# Patient Record
Sex: FEMALE | Race: WHITE | NOT HISPANIC OR LATINO
[De-identification: names, ages, dates, MRNs, and addresses within clinical notes are randomized per-mention and may not be internally consistent; named-entity substitution may affect disease eponyms.]

---

## 2017-01-20 ENCOUNTER — APPOINTMENT (OUTPATIENT)
Dept: PHYSICAL MEDICINE AND REHAB | Facility: CLINIC | Age: 57
End: 2017-01-20

## 2017-01-20 VITALS
OXYGEN SATURATION: 96 % | HEART RATE: 64 BPM | DIASTOLIC BLOOD PRESSURE: 84 MMHG | SYSTOLIC BLOOD PRESSURE: 106 MMHG | TEMPERATURE: 97.9 F

## 2017-01-24 ENCOUNTER — OTHER (OUTPATIENT)
Age: 57
End: 2017-01-24

## 2017-01-24 LAB
24R-OH-CALCIDIOL SERPL-MCNC: 34.4 PG/ML
ALBUMIN SERPL ELPH-MCNC: 4.5 G/DL
ALP BLD-CCNC: 101 U/L
ALT SERPL-CCNC: 27 U/L
ANION GAP SERPL CALC-SCNC: 18 MMOL/L
AST SERPL-CCNC: 26 U/L
BASOPHILS # BLD AUTO: 0.02 K/UL
BASOPHILS NFR BLD AUTO: 0.4 %
BILIRUB SERPL-MCNC: 0.6 MG/DL
BUN SERPL-MCNC: 13 MG/DL
CALCIUM SERPL-MCNC: 10.2 MG/DL
CHLORIDE SERPL-SCNC: 99 MMOL/L
CHOLEST SERPL-MCNC: 210 MG/DL
CHOLEST/HDLC SERPL: 2.5 RATIO
CO2 SERPL-SCNC: 24 MMOL/L
CREAT SERPL-MCNC: 0.44 MG/DL
EOSINOPHIL # BLD AUTO: 0.18 K/UL
EOSINOPHIL NFR BLD AUTO: 3.7 %
GLUCOSE SERPL-MCNC: 95 MG/DL
HCT VFR BLD CALC: 41.2 %
HDLC SERPL-MCNC: 85 MG/DL
HGB BLD-MCNC: 13.3 G/DL
IMM GRANULOCYTES NFR BLD AUTO: 0.2 %
LDLC SERPL CALC-MCNC: 113 MG/DL
LYMPHOCYTES # BLD AUTO: 1.06 K/UL
LYMPHOCYTES NFR BLD AUTO: 21.8 %
MAN DIFF?: NORMAL
MCHC RBC-ENTMCNC: 28.9 PG
MCHC RBC-ENTMCNC: 32.3 GM/DL
MCV RBC AUTO: 89.6 FL
MONOCYTES # BLD AUTO: 0.4 K/UL
MONOCYTES NFR BLD AUTO: 8.2 %
NEUTROPHILS # BLD AUTO: 3.2 K/UL
NEUTROPHILS NFR BLD AUTO: 65.7 %
PLATELET # BLD AUTO: 282 K/UL
POTASSIUM SERPL-SCNC: 3.8 MMOL/L
PROT SERPL-MCNC: 7.2 G/DL
RBC # BLD: 4.6 M/UL
RBC # FLD: 14.2 %
SODIUM SERPL-SCNC: 142 MMOL/L
TRIGL SERPL-MCNC: 58 MG/DL
TSH SERPL-ACNC: 0.76 UIU/ML
WBC # FLD AUTO: 4.87 K/UL

## 2018-12-05 ENCOUNTER — INPATIENT (INPATIENT)
Facility: HOSPITAL | Age: 58
LOS: 3 days | Discharge: HOME | DRG: 602 | End: 2018-12-09
Attending: INTERNAL MEDICINE | Admitting: INTERNAL MEDICINE
Payer: MEDICARE

## 2018-12-05 VITALS
WEIGHT: 149.91 LBS | SYSTOLIC BLOOD PRESSURE: 89 MMHG | RESPIRATION RATE: 12 BRPM | TEMPERATURE: 103 F | DIASTOLIC BLOOD PRESSURE: 66 MMHG | OXYGEN SATURATION: 96 % | HEART RATE: 88 BPM

## 2018-12-05 DIAGNOSIS — L03.119 CELLULITIS OF UNSPECIFIED PART OF LIMB: ICD-10-CM

## 2018-12-05 DIAGNOSIS — G90.4 AUTONOMIC DYSREFLEXIA: ICD-10-CM

## 2018-12-05 DIAGNOSIS — R60.9 EDEMA, UNSPECIFIED: ICD-10-CM

## 2018-12-05 DIAGNOSIS — G95.20 UNSPECIFIED CORD COMPRESSION: ICD-10-CM

## 2018-12-05 DIAGNOSIS — L89.90 PRESSURE ULCER OF UNSPECIFIED SITE, UNSPECIFIED STAGE: ICD-10-CM

## 2018-12-05 DIAGNOSIS — N39.0 URINARY TRACT INFECTION, SITE NOT SPECIFIED: ICD-10-CM

## 2018-12-05 DIAGNOSIS — M62.838 OTHER MUSCLE SPASM: ICD-10-CM

## 2018-12-05 DIAGNOSIS — Z98.1 ARTHRODESIS STATUS: Chronic | ICD-10-CM

## 2018-12-05 DIAGNOSIS — E87.6 HYPOKALEMIA: ICD-10-CM

## 2018-12-05 DIAGNOSIS — Z29.9 ENCOUNTER FOR PROPHYLACTIC MEASURES, UNSPECIFIED: ICD-10-CM

## 2018-12-05 LAB
ALBUMIN SERPL ELPH-MCNC: 2.9 G/DL — LOW (ref 3.3–5)
ALP SERPL-CCNC: 104 U/L — SIGNIFICANT CHANGE UP (ref 40–120)
ALT FLD-CCNC: 60 U/L — SIGNIFICANT CHANGE UP (ref 12–78)
ANION GAP SERPL CALC-SCNC: 11 MMOL/L — SIGNIFICANT CHANGE UP (ref 5–17)
APPEARANCE UR: ABNORMAL
AST SERPL-CCNC: 75 U/L — HIGH (ref 15–37)
BASOPHILS # BLD AUTO: 0 K/UL — SIGNIFICANT CHANGE UP (ref 0–0.2)
BASOPHILS NFR BLD AUTO: 0 % — SIGNIFICANT CHANGE UP (ref 0–2)
BILIRUB SERPL-MCNC: 1.4 MG/DL — HIGH (ref 0.2–1.2)
BILIRUB UR-MCNC: ABNORMAL
BUN SERPL-MCNC: 18 MG/DL — SIGNIFICANT CHANGE UP (ref 7–23)
CALCIUM SERPL-MCNC: 8.4 MG/DL — LOW (ref 8.5–10.1)
CHLORIDE SERPL-SCNC: 101 MMOL/L — SIGNIFICANT CHANGE UP (ref 96–108)
CO2 SERPL-SCNC: 25 MMOL/L — SIGNIFICANT CHANGE UP (ref 22–31)
COLOR SPEC: YELLOW — SIGNIFICANT CHANGE UP
CREAT SERPL-MCNC: 0.33 MG/DL — LOW (ref 0.5–1.3)
DIFF PNL FLD: NEGATIVE — SIGNIFICANT CHANGE UP
EOSINOPHIL # BLD AUTO: 0 K/UL — SIGNIFICANT CHANGE UP (ref 0–0.5)
EOSINOPHIL NFR BLD AUTO: 0 % — SIGNIFICANT CHANGE UP (ref 0–6)
GLUCOSE SERPL-MCNC: 106 MG/DL — HIGH (ref 70–99)
GLUCOSE UR QL: NEGATIVE — SIGNIFICANT CHANGE UP
HCT VFR BLD CALC: 36.4 % — SIGNIFICANT CHANGE UP (ref 34.5–45)
HGB BLD-MCNC: 12.5 G/DL — SIGNIFICANT CHANGE UP (ref 11.5–15.5)
KETONES UR-MCNC: ABNORMAL
LACTATE SERPL-SCNC: 0.9 MMOL/L — SIGNIFICANT CHANGE UP (ref 0.7–2)
LEUKOCYTE ESTERASE UR-ACNC: ABNORMAL
LYMPHOCYTES # BLD AUTO: 0.17 K/UL — LOW (ref 1–3.3)
LYMPHOCYTES # BLD AUTO: 1 % — LOW (ref 13–44)
MCHC RBC-ENTMCNC: 29.3 PG — SIGNIFICANT CHANGE UP (ref 27–34)
MCHC RBC-ENTMCNC: 34.3 GM/DL — SIGNIFICANT CHANGE UP (ref 32–36)
MCV RBC AUTO: 85.4 FL — SIGNIFICANT CHANGE UP (ref 80–100)
MONOCYTES # BLD AUTO: 0.52 K/UL — SIGNIFICANT CHANGE UP (ref 0–0.9)
MONOCYTES NFR BLD AUTO: 3 % — SIGNIFICANT CHANGE UP (ref 2–14)
NEUTROPHILS # BLD AUTO: 16.67 K/UL — HIGH (ref 1.8–7.4)
NEUTROPHILS NFR BLD AUTO: 91 % — HIGH (ref 43–77)
NITRITE UR-MCNC: POSITIVE
PH UR: 5 — SIGNIFICANT CHANGE UP (ref 5–8)
PLATELET # BLD AUTO: 216 K/UL — SIGNIFICANT CHANGE UP (ref 150–400)
POTASSIUM SERPL-MCNC: 3 MMOL/L — LOW (ref 3.5–5.3)
POTASSIUM SERPL-SCNC: 3 MMOL/L — LOW (ref 3.5–5.3)
PROT SERPL-MCNC: 6.5 G/DL — SIGNIFICANT CHANGE UP (ref 6–8.3)
PROT UR-MCNC: 25 MG/DL
RBC # BLD: 4.26 M/UL — SIGNIFICANT CHANGE UP (ref 3.8–5.2)
RBC # FLD: 13.4 % — SIGNIFICANT CHANGE UP (ref 10.3–14.5)
SODIUM SERPL-SCNC: 137 MMOL/L — SIGNIFICANT CHANGE UP (ref 135–145)
SP GR SPEC: 1.02 — SIGNIFICANT CHANGE UP (ref 1.01–1.02)
UROBILINOGEN FLD QL: 1
WBC # BLD: 17.36 K/UL — HIGH (ref 3.8–10.5)
WBC # FLD AUTO: 17.36 K/UL — HIGH (ref 3.8–10.5)

## 2018-12-05 PROCEDURE — 99285 EMERGENCY DEPT VISIT HI MDM: CPT

## 2018-12-05 PROCEDURE — 93010 ELECTROCARDIOGRAM REPORT: CPT

## 2018-12-05 PROCEDURE — 71045 X-RAY EXAM CHEST 1 VIEW: CPT | Mod: 26

## 2018-12-05 PROCEDURE — 93970 EXTREMITY STUDY: CPT | Mod: 26

## 2018-12-05 RX ORDER — SODIUM CHLORIDE 9 MG/ML
1000 INJECTION INTRAMUSCULAR; INTRAVENOUS; SUBCUTANEOUS ONCE
Qty: 0 | Refills: 0 | Status: COMPLETED | OUTPATIENT
Start: 2018-12-05 | End: 2018-12-05

## 2018-12-05 RX ORDER — FAMOTIDINE 10 MG/ML
20 INJECTION INTRAVENOUS
Qty: 0 | Refills: 0 | Status: DISCONTINUED | OUTPATIENT
Start: 2018-12-05 | End: 2018-12-09

## 2018-12-05 RX ORDER — INFLUENZA VIRUS VACCINE 15; 15; 15; 15 UG/.5ML; UG/.5ML; UG/.5ML; UG/.5ML
0.5 SUSPENSION INTRAMUSCULAR ONCE
Qty: 0 | Refills: 0 | Status: DISCONTINUED | OUTPATIENT
Start: 2018-12-05 | End: 2018-12-09

## 2018-12-05 RX ORDER — ACETAMINOPHEN 500 MG
650 TABLET ORAL EVERY 6 HOURS
Qty: 0 | Refills: 0 | Status: DISCONTINUED | OUTPATIENT
Start: 2018-12-05 | End: 2018-12-06

## 2018-12-05 RX ORDER — NITROFURANTOIN MACROCRYSTAL 50 MG
50 CAPSULE ORAL DAILY
Qty: 0 | Refills: 0 | Status: DISCONTINUED | OUTPATIENT
Start: 2018-12-05 | End: 2018-12-09

## 2018-12-05 RX ORDER — RANITIDINE HYDROCHLORIDE 150 MG/1
1 TABLET, FILM COATED ORAL
Qty: 0 | Refills: 0 | COMMUNITY

## 2018-12-05 RX ORDER — PIPERACILLIN AND TAZOBACTAM 4; .5 G/20ML; G/20ML
3.38 INJECTION, POWDER, LYOPHILIZED, FOR SOLUTION INTRAVENOUS ONCE
Qty: 0 | Refills: 0 | Status: COMPLETED | OUTPATIENT
Start: 2018-12-05 | End: 2018-12-05

## 2018-12-05 RX ORDER — VANCOMYCIN HCL 1 G
1000 VIAL (EA) INTRAVENOUS EVERY 12 HOURS
Qty: 0 | Refills: 0 | Status: DISCONTINUED | OUTPATIENT
Start: 2018-12-05 | End: 2018-12-06

## 2018-12-05 RX ORDER — ACETAMINOPHEN 500 MG
650 TABLET ORAL ONCE
Qty: 0 | Refills: 0 | Status: COMPLETED | OUTPATIENT
Start: 2018-12-05 | End: 2018-12-05

## 2018-12-05 RX ORDER — OXYBUTYNIN CHLORIDE 5 MG
1 TABLET ORAL
Qty: 0 | Refills: 0 | COMMUNITY

## 2018-12-05 RX ORDER — OXYBUTYNIN CHLORIDE 5 MG
2.5 TABLET ORAL
Qty: 0 | Refills: 0 | Status: DISCONTINUED | OUTPATIENT
Start: 2018-12-05 | End: 2018-12-09

## 2018-12-05 RX ORDER — POTASSIUM CHLORIDE 20 MEQ
40 PACKET (EA) ORAL EVERY 4 HOURS
Qty: 0 | Refills: 0 | Status: COMPLETED | OUTPATIENT
Start: 2018-12-05 | End: 2018-12-06

## 2018-12-05 RX ORDER — POTASSIUM CHLORIDE 20 MEQ
40 PACKET (EA) ORAL EVERY 4 HOURS
Qty: 0 | Refills: 0 | Status: DISCONTINUED | OUTPATIENT
Start: 2018-12-05 | End: 2018-12-05

## 2018-12-05 RX ORDER — BACLOFEN 100 %
10 POWDER (GRAM) MISCELLANEOUS
Qty: 0 | Refills: 0 | Status: DISCONTINUED | OUTPATIENT
Start: 2018-12-05 | End: 2018-12-09

## 2018-12-05 RX ORDER — NITROFURANTOIN MACROCRYSTAL 50 MG
50 CAPSULE ORAL
Qty: 0 | Refills: 0 | Status: DISCONTINUED | OUTPATIENT
Start: 2018-12-05 | End: 2018-12-05

## 2018-12-05 RX ORDER — NITROFURANTOIN MACROCRYSTAL 50 MG
0 CAPSULE ORAL
Qty: 0 | Refills: 0 | COMMUNITY

## 2018-12-05 RX ORDER — BACLOFEN 100 %
0 POWDER (GRAM) MISCELLANEOUS
Qty: 0 | Refills: 0 | COMMUNITY

## 2018-12-05 RX ORDER — OXYBUTYNIN CHLORIDE 5 MG
0.25 TABLET ORAL
Qty: 0 | Refills: 0 | COMMUNITY

## 2018-12-05 RX ORDER — ENOXAPARIN SODIUM 100 MG/ML
40 INJECTION SUBCUTANEOUS DAILY
Qty: 0 | Refills: 0 | Status: DISCONTINUED | OUTPATIENT
Start: 2018-12-05 | End: 2018-12-09

## 2018-12-05 RX ORDER — OXYBUTYNIN CHLORIDE 5 MG
1 TABLET ORAL THREE TIMES A DAY
Qty: 0 | Refills: 0 | Status: DISCONTINUED | OUTPATIENT
Start: 2018-12-05 | End: 2018-12-05

## 2018-12-05 RX ORDER — VANCOMYCIN HCL 1 G
1000 VIAL (EA) INTRAVENOUS ONCE
Qty: 0 | Refills: 0 | Status: COMPLETED | OUTPATIENT
Start: 2018-12-05 | End: 2018-12-05

## 2018-12-05 RX ORDER — POTASSIUM CHLORIDE 20 MEQ
40 PACKET (EA) ORAL ONCE
Qty: 0 | Refills: 0 | Status: COMPLETED | OUTPATIENT
Start: 2018-12-05 | End: 2018-12-05

## 2018-12-05 RX ORDER — SODIUM CHLORIDE 9 MG/ML
1000 INJECTION INTRAMUSCULAR; INTRAVENOUS; SUBCUTANEOUS
Qty: 0 | Refills: 0 | Status: DISCONTINUED | OUTPATIENT
Start: 2018-12-05 | End: 2018-12-06

## 2018-12-05 RX ADMIN — SODIUM CHLORIDE 1000 MILLILITER(S): 9 INJECTION INTRAMUSCULAR; INTRAVENOUS; SUBCUTANEOUS at 15:53

## 2018-12-05 RX ADMIN — PIPERACILLIN AND TAZOBACTAM 3.38 GRAM(S): 4; .5 INJECTION, POWDER, LYOPHILIZED, FOR SOLUTION INTRAVENOUS at 14:55

## 2018-12-05 RX ADMIN — Medication 40 MILLIEQUIVALENT(S): at 21:02

## 2018-12-05 RX ADMIN — SODIUM CHLORIDE 1000 MILLILITER(S): 9 INJECTION INTRAMUSCULAR; INTRAVENOUS; SUBCUTANEOUS at 13:38

## 2018-12-05 RX ADMIN — Medication 10 MILLIGRAM(S): at 21:02

## 2018-12-05 RX ADMIN — Medication 250 MILLIGRAM(S): at 14:55

## 2018-12-05 RX ADMIN — PIPERACILLIN AND TAZOBACTAM 200 GRAM(S): 4; .5 INJECTION, POWDER, LYOPHILIZED, FOR SOLUTION INTRAVENOUS at 13:38

## 2018-12-05 RX ADMIN — Medication 1000 MILLIGRAM(S): at 15:55

## 2018-12-05 RX ADMIN — SODIUM CHLORIDE 1000 MILLILITER(S): 9 INJECTION INTRAMUSCULAR; INTRAVENOUS; SUBCUTANEOUS at 18:14

## 2018-12-05 RX ADMIN — SODIUM CHLORIDE 1000 MILLILITER(S): 9 INJECTION INTRAMUSCULAR; INTRAVENOUS; SUBCUTANEOUS at 21:03

## 2018-12-05 RX ADMIN — SODIUM CHLORIDE 1000 MILLILITER(S): 9 INJECTION INTRAMUSCULAR; INTRAVENOUS; SUBCUTANEOUS at 14:51

## 2018-12-05 RX ADMIN — Medication 650 MILLIGRAM(S): at 13:33

## 2018-12-05 RX ADMIN — SODIUM CHLORIDE 1000 MILLILITER(S): 9 INJECTION INTRAMUSCULAR; INTRAVENOUS; SUBCUTANEOUS at 15:54

## 2018-12-05 RX ADMIN — SODIUM CHLORIDE 1000 MILLILITER(S): 9 INJECTION INTRAMUSCULAR; INTRAVENOUS; SUBCUTANEOUS at 14:55

## 2018-12-05 RX ADMIN — Medication 650 MILLIGRAM(S): at 14:55

## 2018-12-05 RX ADMIN — SODIUM CHLORIDE 100 MILLILITER(S): 9 INJECTION INTRAMUSCULAR; INTRAVENOUS; SUBCUTANEOUS at 23:48

## 2018-12-05 NOTE — H&P ADULT - NSHPSOCIALHISTORY_GEN_ALL_CORE
Lives at home with siblings. Has 24 hour aides who perform all ADLs.   Denies EtOH use, tobacco use, recreational drug use.  Uses motorized wheelchair. Lives at home with siblings. Has 24 hour aides who perform all ADLs.   Denies EtOH use, tobacco use, recreational drug use.  Uses motorized wheelchair.  NO Flu/ PNA Vaccine,   Mammogram - Normal 2018  Colonoscopy -Nl 2018  PAP smear- Normal

## 2018-12-05 NOTE — H&P ADULT - NEUROLOGICAL DETAILS
responds to verbal commands/alert and oriented x 3/no spontaneous movement/strength decreased no spontaneous movement/alert and oriented x 3/responds to verbal commands/strength decreased/2/2 Quadriplegic/cranial nerves intact

## 2018-12-05 NOTE — ED PROVIDER NOTE - MEDICAL DECISION MAKING DETAILS
bilateral le cellulitis, right >leg, dry abrasion, hx of spinal cord injury, does not ambulate, will obtain labs, give iv abx, obtain doppler r/o dvt

## 2018-12-05 NOTE — H&P ADULT - MUSCULOSKELETAL
details… detailed exam no strength/no AROM, strength in bilateral LE/decreased ROM no joint swelling/no strength/decreased ROM/no AROM, strength in bilateral LE

## 2018-12-05 NOTE — H&P ADULT - SKIN
detailed exam erythematous, macular rash on right anterior shin, stage 1 pressure ulcer on right knee, erythema from dorsum of right foot to level of knee erythema/erythematous, macular rash on right anterior shin, stage 1 pressure ulcer on right knee, erythema from dorsum of right foot to level of knee/warm and dry

## 2018-12-05 NOTE — H&P ADULT - PROBLEM SELECTOR PLAN 7
- holding patient's HCTZ for dehdyration and hypokalemia  - continue to monitor for increased LE edema, electrolyte abnormalities - holding patient's HCTZ for dehydration and hypokalemia  - continue to monitor for increased LE edema, electrolyte abnormalities

## 2018-12-05 NOTE — H&P ADULT - PROBLEM SELECTOR PLAN 3
- K 3.0 likely 2/2 dehydration from vomiting and reduced po intake  - s/p KCl 40 mEqs, 3 1-L boluses NS in ED  - Will order KCl 40 mEq po x3   - follow up AM CMP  - encourage po intake - K 3.0 likely 2/2 dehydration from vomiting and reduced po intake  - s/p KCl 40 mEqs, 3 1-L boluses NS in ED  - Will order KCl 40 mEq po x2 stat  - follow up AM CMP  - encourage po intake

## 2018-12-05 NOTE — H&P ADULT - EXTREMITIES COMMENTS
RLE erythematous and warm to touch from dorsum of foot to knee. Flat, erythematous rash, approximately 5 cm in length x 3 cm in width on right anterior shin. Stage 1 ulcer on right knee with surrounding dry skin RLE erythematous and warm to touch from dorsum of foot to knee. Flat, erythema , warm - right anterior shin./Rt lower EXT Stage 1 dry ulcer on right knee with surrounding dry skin

## 2018-12-05 NOTE — ED PROVIDER NOTE - OBJECTIVE STATEMENT
58 female presents with le cellulitis, states it began yesterday,  hx of spinal cord injury, does not ambulate,  denies fever, no chills.  states she is on macrobid 50 mg maintenance abx prescribed by her pmd Dr Davion Long.   patient states blood pressure is normally 90/60  PMH:   Spinal cord compression  spinal cord injury c4, c5, c6  Water retention

## 2018-12-05 NOTE — H&P ADULT - PROBLEM SELECTOR PLAN 1
- right LE  - with WBC >17 and fever > 102  - s/p vanc and zosyn in ED  - continue vancomycin IV  - patient is not diabetic, will hold Zosyn for now  - ID consult, Dr. Swanson  - patient has diminished UO, likely 2/2 fever and vomiting, s/p 3 1-L boluses  - will give additional 1L bolus, then maintenance fluids at 100 mL/hour  - follow up blood, urine cultures  - continue tylenol suppositories PRN for fever - right LE  - with WBC >17 and fever > 102  - s/p vanc and zosyn in ED  - continue vancomycin IV  - follow up vanc trough  - patient is not diabetic, will hold Zosyn for now  - ID consult, Dr. Swanson  - patient has diminished UO, likely 2/2 fever and vomiting, s/p 3 1-L boluses  - will give additional 1L bolus, then maintenance fluids at 100 mL/hour  - follow up blood, urine cultures  - follow up chest Xray  - continue tylenol suppositories PRN for fever - right LE  - with leukocytosis- WBC >17 and fever > 102  - s/p vanc and zosyn in ED  - continue vancomycin IV 1 Gm q 12 hrs   - follow up vanc trough  - patient is not diabetic, will hold Zosyn for now  - ID consult, Dr. Swanson  - patient has diminished UO, likely 2/2 fever and vomiting, s/p 3 1-L boluses  - will give additional 1L bolus, then maintenance fluids at 100 mL/hour  - follow up blood c/s x 2 , urine cultures  - follow up chest Xray  - continue Tylenol suppositories PRN for fever

## 2018-12-05 NOTE — H&P ADULT - FAMILY HISTORY
Mother  Still living? Unknown  Family history of diabetes mellitus (DM), Age at diagnosis: Age Unknown     Father  Still living? No  Family history of early CAD, Age at diagnosis: Age Unknown

## 2018-12-05 NOTE — ED ADULT TRIAGE NOTE - CHIEF COMPLAINT QUOTE
pt from home, reports right lower leg redness and swelling x 2 days, denies fever, vomiting x 1 yesterday

## 2018-12-05 NOTE — H&P ADULT - NEGATIVE RESPIRATORY AND THORAX SYMPTOMS
no cough/no hemoptysis/no pleuritic chest pain/no dyspnea no wheezing/no hemoptysis/no pleuritic chest pain/no dyspnea/no cough

## 2018-12-05 NOTE — H&P ADULT - PROBLEM SELECTOR PLAN 8
- patient takes nitrofurantoin crystals 50 mg daily as prophylaxis  - nonformulary, patient will need to bring from home - patient takes nitrofurantoin crystals 50 mg daily as prophylaxis  - non formulary, patient will need to bring from home

## 2018-12-05 NOTE — H&P ADULT - RS GEN PE MLT RESP DETAILS PC
no rales/respirations non-labored/good air movement/no rhonchi/clear to auscultation bilaterally/no wheezes/no subcutaneous emphysema

## 2018-12-05 NOTE — H&P ADULT - PROBLEM SELECTOR PLAN 2
- patient reports repeated episodes of hypertension and bradycardia, with associated headache when she is not able to self cath her bladder as quickly as possible  - patient's sister and aides are trained to perform cath and patient has brought own supplies.   - however, patient's sister needs to leave the state on Friday and will not be available for at least a week  - patient will require call button designed for people with limited dexterity so she can alert nursing staff when she needs to be cathed - patient reports repeated episodes of hypertension and bradycardia, with associated headache when she is not able to self cath her bladder as quickly as possible  - patient's sister and aides are trained to perform cath and patient has brought own supplies.   - however, patient's sister needs to leave the state on Friday and will not be available for at least a week  - patient will require call button designed for people with limited dexterity so she can alert nursing staff when she needs to be cathed  - straight cath 5x/day - patient reports repeated episodes of hypertension and bradycardia, with associated headache when she is not able to self cath her bladder as quickly as possible  - patient's sister and aides are trained to perform cath and patient has brought own supplies.   - however, patient's sister needs to leave the state on Friday and will not be available for at least a week  - patient will require call button designed for people with limited dexterity so she can alert nursing staff when she needs to be cath   - straight cath 5x/day

## 2018-12-05 NOTE — CHART NOTE - NSCHARTNOTEFT_GEN_A_CORE
Patient is a 58y old  Female who presents with a chief complaint of right lower extremity redness and swelling. Nursing called to notify PGY2 of elevated temperature of 102.7F and low BP of 93/38 electronic cuff. Patient is quadriplegic and became aware of redness and swelling today. Patient endorsed vomiting last night, and intermittent chills and fever over the past two days, but denies all constitutional symptoms at time of exam. At time of exam, patient was comfortable in bed, in the left lateral recumbant position, with sister at bedside. Repeat BP after 500cc bolus IV was 93/58 manually. Patient denies pmhx of CHF.    Vital Signs Last 24 Hrs  T(C): 39.3 (05 Dec 2018 13:00), Max: 39.3 (05 Dec 2018 13:00)  T(F): 102.7 (05 Dec 2018 13:00), Max: 102.7 (05 Dec 2018 13:00)  HR: 88 (05 Dec 2018 14:26) (86 - 88)  BP: 93/58 (05 Dec 2018 14:26) (89/66 - 93/58)  BP(mean): --  RR: 14 (05 Dec 2018 14:26) (13 - 14)  SpO2: --                          12.5   17.36 )-----------( 216      ( 05 Dec 2018 13:23 )             36.4       Lactate, Blood: 0.9 mmol/L (12-05 @ 14:35)          MEDICATIONS  (STANDING):  potassium chloride    Tablet ER 40 milliEquivalent(s) Oral once  sodium chloride 0.9% Bolus 1000 milliLiter(s) IV Bolus once    MEDICATIONS  (PRN):      Antibiotics Given [ x]  Zosyn 3.375gm IV x 1 and Vancomycin 1000mg IV x 1    Physical Exam:  General: well nourished female, NAD   HEENT: NCAT, MMM, EOMI  Cardio: +S1S2 No M/R/G, No JVD  Pulm: CTA B/L no W/R/R  Skin: distal right lower extremity erythema, dry, Capillary refill <2 seconds, good skin turgor  Abd: Soft, NT, ND, normal BS x 4 quadrants  Ext: b/l +1 non-pitting edema, 2+ pulses throughout, evidence of distal extremity atrophy 2/2 quadriplegia, contracted distal upper extremities 2/2 quadriplegia       A/P: 58y  Female p/w right lower extremity cellulitis  [x ] Blood cultures drawn, f/u results  [x ] Initial Lactate drawn, found to be 0.9  [x ] Repeat Lactate ordered, for 5:35pm  [x ] Antibiotics given, Zosyn 3.375gm IV x 1 and Vancomycin 1000mg IV x 1  [x ] Fluid resuscitation, >30cc/kg. Total amount of fluid given 3L  [x ] Attending Notification, Discussed with Dr. Valerio

## 2018-12-05 NOTE — H&P ADULT - PROBLEM SELECTOR PLAN 4
- stage 1 on right knee  - frequent repositioning  - will order foam cushion for right knee  - wound care consult
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
## 2018-12-05 NOTE — H&P ADULT - PROBLEM SELECTOR PLAN 9
Lovenox for DVT PPX  Famotidine for dyspepsia  bedbound, needs frequent repositioning    IMPROVE VTE Individual Risk Assessment          RISK                                                          Points  [  ] Previous VTE                                                3  [  ] Thrombophilia                                             2  [  ] Lower limb paralysis                                   2        (unable to hold up >15 seconds)    [  ] Current Cancer                                             2         (within 6 months)  [  ] Immobilization > 24 hrs                              1  [  ] ICU/CCU stay > 24 hours                             1  [  ] Age > 60                                                         1    IMPROVE VTE Score: 3 Lovenox for DVT PPX  Famotidine for dyspepsia  bedbound, needs frequent repositioning  Social work consult, patient's sister, who helps provide care, moving to [place]    IMPROVE VTE Individual Risk Assessment          RISK                                                          Points  [  ] Previous VTE                                                3  [  ] Thrombophilia                                             2  [  ] Lower limb paralysis                                   2        (unable to hold up >15 seconds)    [  ] Current Cancer                                             2         (within 6 months)  [  ] Immobilization > 24 hrs                              1  [  ] ICU/CCU stay > 24 hours                             1  [  ] Age > 60                                                         1    IMPROVE VTE Score: 3
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
## 2018-12-05 NOTE — ED PROVIDER NOTE - ATTENDING CONTRIBUTION TO CARE
Fever and chills in this female with quadriplegia. No N/V/D/Cough. Exam revealed female in NAD with marked warmth and erythema to LLE. Quadriplegia. I agree with plan  and management outlined by PA.

## 2018-12-05 NOTE — ED ADULT NURSE NOTE - NSIMPLEMENTINTERV_GEN_ALL_ED
Implemented All Universal Safety Interventions:  [place] to call system. Call bell, personal items and telephone within reach. Instruct patient to call for assistance. Room bathroom lighting operational. Non-slip footwear when patient is off stretcher. Physically safe environment: no spills, clutter or unnecessary equipment. Stretcher in lowest position, wheels locked, appropriate side rails in place.

## 2018-12-05 NOTE — H&P ADULT - PROBLEM SELECTOR PLAN 5
- due cervical spine injury  - s/p cervical fusion surgery at age 15  - frequent repositioning  - self cath supplies brought from home

## 2018-12-05 NOTE — H&P ADULT - NEGATIVE ENMT SYMPTOMS
no throat pain/no dysphagia/no hearing difficulty/no nasal congestion/no ear pain/no nasal discharge/no vertigo/no sinus symptoms/no tinnitus

## 2018-12-05 NOTE — H&P ADULT - MOTOR
No spontaneous movement in bilateral LE, no sensation in bilateral LE. Significantly diminished movement in bilateral UEs (able to pat hands onto button, but not press button with finger or  raise arms)

## 2018-12-06 ENCOUNTER — TRANSCRIPTION ENCOUNTER (OUTPATIENT)
Age: 58
End: 2018-12-06

## 2018-12-06 DIAGNOSIS — D64.9 ANEMIA, UNSPECIFIED: ICD-10-CM

## 2018-12-06 LAB
ALBUMIN SERPL ELPH-MCNC: 3 G/DL — LOW (ref 3.3–5)
ALP SERPL-CCNC: 110 U/L — SIGNIFICANT CHANGE UP (ref 40–120)
ALT FLD-CCNC: 58 U/L — SIGNIFICANT CHANGE UP (ref 12–78)
ANION GAP SERPL CALC-SCNC: 11 MMOL/L — SIGNIFICANT CHANGE UP (ref 5–17)
AST SERPL-CCNC: 54 U/L — HIGH (ref 15–37)
BASE EXCESS BLDA CALC-SCNC: -3.4 MMOL/L — LOW (ref -2–2)
BASOPHILS # BLD AUTO: 0.01 K/UL — SIGNIFICANT CHANGE UP (ref 0–0.2)
BASOPHILS NFR BLD AUTO: 0.1 % — SIGNIFICANT CHANGE UP (ref 0–2)
BILIRUB SERPL-MCNC: 1.8 MG/DL — HIGH (ref 0.2–1.2)
BLOOD GAS COMMENTS ARTERIAL: SIGNIFICANT CHANGE UP
BLOOD GAS COMMENTS ARTERIAL: SIGNIFICANT CHANGE UP
BUN SERPL-MCNC: 9 MG/DL — SIGNIFICANT CHANGE UP (ref 7–23)
CALCIUM SERPL-MCNC: 7.6 MG/DL — LOW (ref 8.5–10.1)
CHLORIDE SERPL-SCNC: 105 MMOL/L — SIGNIFICANT CHANGE UP (ref 96–108)
CO2 SERPL-SCNC: 21 MMOL/L — LOW (ref 22–31)
CREAT SERPL-MCNC: 0.22 MG/DL — LOW (ref 0.5–1.3)
CULTURE RESULTS: NO GROWTH — SIGNIFICANT CHANGE UP
EOSINOPHIL # BLD AUTO: 0 K/UL — SIGNIFICANT CHANGE UP (ref 0–0.5)
EOSINOPHIL NFR BLD AUTO: 0 % — SIGNIFICANT CHANGE UP (ref 0–6)
GLUCOSE SERPL-MCNC: 75 MG/DL — SIGNIFICANT CHANGE UP (ref 70–99)
HCO3 BLDA-SCNC: 22 MMOL/L — LOW (ref 23–27)
HCT VFR BLD CALC: 31.6 % — LOW (ref 34.5–45)
HGB BLD-MCNC: 10.7 G/DL — LOW (ref 11.5–15.5)
HOROWITZ INDEX BLDA+IHG-RTO: 21 — SIGNIFICANT CHANGE UP
IMM GRANULOCYTES NFR BLD AUTO: 0.6 % — SIGNIFICANT CHANGE UP (ref 0–1.5)
LYMPHOCYTES # BLD AUTO: 0.38 K/UL — LOW (ref 1–3.3)
LYMPHOCYTES # BLD AUTO: 3.9 % — LOW (ref 13–44)
MCHC RBC-ENTMCNC: 29.3 PG — SIGNIFICANT CHANGE UP (ref 27–34)
MCHC RBC-ENTMCNC: 33.9 GM/DL — SIGNIFICANT CHANGE UP (ref 32–36)
MCV RBC AUTO: 86.6 FL — SIGNIFICANT CHANGE UP (ref 80–100)
MONOCYTES # BLD AUTO: 0.71 K/UL — SIGNIFICANT CHANGE UP (ref 0–0.9)
MONOCYTES NFR BLD AUTO: 7.2 % — SIGNIFICANT CHANGE UP (ref 2–14)
NEUTROPHILS # BLD AUTO: 8.68 K/UL — HIGH (ref 1.8–7.4)
NEUTROPHILS NFR BLD AUTO: 88.2 % — HIGH (ref 43–77)
PCO2 BLDA: 36 MMHG — SIGNIFICANT CHANGE UP (ref 32–46)
PH BLDA: 7.39 — SIGNIFICANT CHANGE UP (ref 7.35–7.45)
PLATELET # BLD AUTO: 162 K/UL — SIGNIFICANT CHANGE UP (ref 150–400)
PO2 BLDA: 82 MMHG — SIGNIFICANT CHANGE UP (ref 74–108)
POTASSIUM SERPL-MCNC: 3.1 MMOL/L — LOW (ref 3.5–5.3)
POTASSIUM SERPL-SCNC: 3.1 MMOL/L — LOW (ref 3.5–5.3)
PROT SERPL-MCNC: 6.2 G/DL — SIGNIFICANT CHANGE UP (ref 6–8.3)
RBC # BLD: 3.65 M/UL — LOW (ref 3.8–5.2)
RBC # FLD: 13.5 % — SIGNIFICANT CHANGE UP (ref 10.3–14.5)
SAO2 % BLDA: 96 % — SIGNIFICANT CHANGE UP (ref 92–96)
SODIUM SERPL-SCNC: 137 MMOL/L — SIGNIFICANT CHANGE UP (ref 135–145)
SPECIMEN SOURCE: SIGNIFICANT CHANGE UP
WBC # BLD: 9.84 K/UL — SIGNIFICANT CHANGE UP (ref 3.8–10.5)
WBC # FLD AUTO: 9.84 K/UL — SIGNIFICANT CHANGE UP (ref 3.8–10.5)

## 2018-12-06 PROCEDURE — 93010 ELECTROCARDIOGRAM REPORT: CPT

## 2018-12-06 PROCEDURE — 71045 X-RAY EXAM CHEST 1 VIEW: CPT | Mod: 26

## 2018-12-06 RX ORDER — AMPICILLIN SODIUM AND SULBACTAM SODIUM 250; 125 MG/ML; MG/ML
3 INJECTION, POWDER, FOR SUSPENSION INTRAMUSCULAR; INTRAVENOUS EVERY 6 HOURS
Qty: 0 | Refills: 0 | Status: DISCONTINUED | OUTPATIENT
Start: 2018-12-06 | End: 2018-12-09

## 2018-12-06 RX ORDER — VANCOMYCIN HCL 1 G
1000 VIAL (EA) INTRAVENOUS
Qty: 0 | Refills: 0 | Status: DISCONTINUED | OUTPATIENT
Start: 2018-12-06 | End: 2018-12-06

## 2018-12-06 RX ORDER — ACETAMINOPHEN 500 MG
650 TABLET ORAL EVERY 6 HOURS
Qty: 0 | Refills: 0 | Status: DISCONTINUED | OUTPATIENT
Start: 2018-12-06 | End: 2018-12-09

## 2018-12-06 RX ORDER — ACETAMINOPHEN 500 MG
650 TABLET ORAL ONCE
Qty: 0 | Refills: 0 | Status: COMPLETED | OUTPATIENT
Start: 2018-12-06 | End: 2018-12-06

## 2018-12-06 RX ORDER — HYDROCHLOROTHIAZIDE 25 MG
50 TABLET ORAL ONCE
Qty: 0 | Refills: 0 | Status: COMPLETED | OUTPATIENT
Start: 2018-12-06 | End: 2018-12-06

## 2018-12-06 RX ORDER — POTASSIUM CHLORIDE 20 MEQ
10 PACKET (EA) ORAL
Qty: 0 | Refills: 0 | Status: DISCONTINUED | OUTPATIENT
Start: 2018-12-06 | End: 2018-12-06

## 2018-12-06 RX ORDER — IBUPROFEN 200 MG
600 TABLET ORAL ONCE
Qty: 0 | Refills: 0 | Status: COMPLETED | OUTPATIENT
Start: 2018-12-06 | End: 2018-12-06

## 2018-12-06 RX ORDER — SENNA PLUS 8.6 MG/1
2 TABLET ORAL AT BEDTIME
Qty: 0 | Refills: 0 | Status: DISCONTINUED | OUTPATIENT
Start: 2018-12-06 | End: 2018-12-09

## 2018-12-06 RX ORDER — AMPICILLIN SODIUM AND SULBACTAM SODIUM 250; 125 MG/ML; MG/ML
INJECTION, POWDER, FOR SUSPENSION INTRAMUSCULAR; INTRAVENOUS
Qty: 0 | Refills: 0 | Status: DISCONTINUED | OUTPATIENT
Start: 2018-12-06 | End: 2018-12-09

## 2018-12-06 RX ORDER — POTASSIUM CHLORIDE 20 MEQ
40 PACKET (EA) ORAL EVERY 4 HOURS
Qty: 0 | Refills: 0 | Status: DISCONTINUED | OUTPATIENT
Start: 2018-12-06 | End: 2018-12-06

## 2018-12-06 RX ORDER — ACETAMINOPHEN 500 MG
650 TABLET ORAL EVERY 6 HOURS
Qty: 0 | Refills: 0 | Status: DISCONTINUED | OUTPATIENT
Start: 2018-12-06 | End: 2018-12-06

## 2018-12-06 RX ORDER — AMPICILLIN SODIUM AND SULBACTAM SODIUM 250; 125 MG/ML; MG/ML
3 INJECTION, POWDER, FOR SUSPENSION INTRAMUSCULAR; INTRAVENOUS ONCE
Qty: 0 | Refills: 0 | Status: COMPLETED | OUTPATIENT
Start: 2018-12-06 | End: 2018-12-06

## 2018-12-06 RX ORDER — POTASSIUM CHLORIDE 20 MEQ
40 PACKET (EA) ORAL EVERY 4 HOURS
Qty: 0 | Refills: 0 | Status: COMPLETED | OUTPATIENT
Start: 2018-12-06 | End: 2018-12-06

## 2018-12-06 RX ORDER — DOCUSATE SODIUM 100 MG
100 CAPSULE ORAL THREE TIMES A DAY
Qty: 0 | Refills: 0 | Status: DISCONTINUED | OUTPATIENT
Start: 2018-12-06 | End: 2018-12-09

## 2018-12-06 RX ORDER — SODIUM CHLORIDE 9 MG/ML
1000 INJECTION INTRAMUSCULAR; INTRAVENOUS; SUBCUTANEOUS
Qty: 0 | Refills: 0 | Status: DISCONTINUED | OUTPATIENT
Start: 2018-12-06 | End: 2018-12-06

## 2018-12-06 RX ORDER — ALPRAZOLAM 0.25 MG
0.25 TABLET ORAL ONCE
Qty: 0 | Refills: 0 | Status: DISCONTINUED | OUTPATIENT
Start: 2018-12-06 | End: 2018-12-06

## 2018-12-06 RX ADMIN — Medication 650 MILLIGRAM(S): at 10:13

## 2018-12-06 RX ADMIN — Medication 40 MILLIEQUIVALENT(S): at 17:36

## 2018-12-06 RX ADMIN — Medication 10 MILLIGRAM(S): at 12:00

## 2018-12-06 RX ADMIN — Medication 40 MILLIEQUIVALENT(S): at 06:21

## 2018-12-06 RX ADMIN — Medication 600 MILLIGRAM(S): at 12:41

## 2018-12-06 RX ADMIN — Medication 40 MILLIEQUIVALENT(S): at 10:16

## 2018-12-06 RX ADMIN — FAMOTIDINE 20 MILLIGRAM(S): 10 INJECTION INTRAVENOUS at 06:20

## 2018-12-06 RX ADMIN — Medication 600 MILLIGRAM(S): at 19:59

## 2018-12-06 RX ADMIN — Medication 100 MILLIGRAM(S): at 13:33

## 2018-12-06 RX ADMIN — Medication 50 MILLIGRAM(S): at 13:32

## 2018-12-06 RX ADMIN — AMPICILLIN SODIUM AND SULBACTAM SODIUM 200 GRAM(S): 250; 125 INJECTION, POWDER, FOR SUSPENSION INTRAMUSCULAR; INTRAVENOUS at 17:36

## 2018-12-06 RX ADMIN — FAMOTIDINE 20 MILLIGRAM(S): 10 INJECTION INTRAVENOUS at 17:32

## 2018-12-06 RX ADMIN — Medication 650 MILLIGRAM(S): at 10:31

## 2018-12-06 RX ADMIN — Medication 600 MILLIGRAM(S): at 20:40

## 2018-12-06 RX ADMIN — ENOXAPARIN SODIUM 40 MILLIGRAM(S): 100 INJECTION SUBCUTANEOUS at 11:59

## 2018-12-06 RX ADMIN — Medication 650 MILLIGRAM(S): at 16:46

## 2018-12-06 RX ADMIN — Medication 40 MILLIEQUIVALENT(S): at 13:32

## 2018-12-06 RX ADMIN — Medication 600 MILLIGRAM(S): at 13:30

## 2018-12-06 RX ADMIN — Medication 10 MILLIGRAM(S): at 06:21

## 2018-12-06 RX ADMIN — AMPICILLIN SODIUM AND SULBACTAM SODIUM 200 GRAM(S): 250; 125 INJECTION, POWDER, FOR SUSPENSION INTRAMUSCULAR; INTRAVENOUS at 11:54

## 2018-12-06 RX ADMIN — Medication 2.5 MILLIGRAM(S): at 17:32

## 2018-12-06 RX ADMIN — Medication 100 MILLIGRAM(S): at 21:38

## 2018-12-06 RX ADMIN — Medication 250 MILLIGRAM(S): at 02:27

## 2018-12-06 RX ADMIN — Medication 10 MILLIGRAM(S): at 17:32

## 2018-12-06 RX ADMIN — Medication 650 MILLIGRAM(S): at 17:30

## 2018-12-06 RX ADMIN — Medication 2.5 MILLIGRAM(S): at 06:21

## 2018-12-06 NOTE — DISCHARGE NOTE ADULT - MEDICATION SUMMARY - MEDICATIONS TO TAKE
I will START or STAY ON the medications listed below when I get home from the hospital:    hydroCHLOROthiazide 50 mg oral tablet  -- 1 tab(s) by mouth once a day  -- Indication: For Edema    raNITIdine 150 mg oral capsule  -- 1 cap(s) by mouth 2 times a day  -- Indication: For Need for prophylactic measure    Cranberry oral tablet  -- orally once a day  -- Indication: For Need for prophylactic measure    senna oral tablet  -- 2 tab(s) by mouth once a day (at bedtime)  -- Indication: For laxative    docusate sodium 100 mg oral capsule  -- 1 cap(s) by mouth 3 times a day  -- Indication: For laxative    baclofen 10 mg oral tablet  -- orally 4 times a day  -- Indication: For Muscle spasm    Augmentin 875 mg-125 mg oral tablet  -- 1 tab(s) by mouth every 12 hours   -- Finish all this medication unless otherwise directed by prescriber.  Take with food or milk.    -- Indication: For Cellulitis of extremity    Bacid (LAC) oral tablet  -- 1 tab(s) by mouth 2 times a day   -- Indication: For Need for prophylactic measure    nitrofurantoin macrocrystals 50 mg oral capsule  -- orally once a day  -- Indication: For Need for prophylactic measure    oxybutynin 5 mg oral tablet  -- 0.25 tab(s) by mouth 3 times a day  -- Indication: For Need for prophylactic measure I will START or STAY ON the medications listed below when I get home from the hospital:    ibuprofen 400 mg oral tablet  -- 1 tab(s) by mouth every 6 hours, As needed, for pain , HA  -- Indication: For Pain. HA    hydroCHLOROthiazide 50 mg oral tablet  -- 1 tab(s) by mouth once a day as needed for edema  -- Indication: For Edema/ Fluid over load    raNITIdine 150 mg oral capsule  -- 1 cap(s) by mouth 2 times a day  -- Indication: For GI prophylaxis    Cranberry oral tablet  -- orally once a day  -- Indication: For Supplements    senna oral tablet  -- 2 tab(s) by mouth once a day (at bedtime)  -- Indication: For laxative    docusate sodium 100 mg oral capsule  -- 1 cap(s) by mouth 3 times a day  -- Indication: For laxative    baclofen 10 mg oral tablet  -- orally 4 times a day  -- Indication: For Muscle spasm    Augmentin 875 mg-125 mg oral tablet  -- 1 tab(s) by mouth every 12 hours   -- Finish all this medication unless otherwise directed by prescriber.  Take with food or milk.    -- Indication: For Cellulitis of extremity    Bacid (LAC) oral tablet  -- 1 tab(s) by mouth 2 times a day   -- Indication: For Probiotics    nitrofurantoin macrocrystals 50 mg oral capsule  -- orally once a day  -- Indication: For Need for prophylactic for UTI prevention    oxybutynin 5 mg oral tablet  -- 0.25 tab(s) by mouth 3 times a day  -- Indication: For Neurogenic bladder

## 2018-12-06 NOTE — PROGRESS NOTE ADULT - PROBLEM SELECTOR PLAN 5
- due cervical spine injury  - s/p cervical fusion surgery at age 15  - frequent repositioning  - self cath supplies brought from home - due cervical spine injury  - s/p cervical fusion surgery at age 15  - frequent repositioning, using heating pad from home for neck pain  - self cath supplies brought from home - with contractures of hands  - continue baclofen

## 2018-12-06 NOTE — PROGRESS NOTE ADULT - PROBLEM SELECTOR PLAN 3
- K 3.0 likely 2/2 dehydration from vomiting and reduced po intake  - s/p KCl 40 mEqs, 3 1-L boluses NS in ED  - Will order KCl 40 mEq po x2 stat  - follow up AM CMP  - encourage po intake - K 3.1 likely 2/2 dehydration from vomiting and reduced po intake  - s/p KCl 40 mEqs, 3 1-L boluses NS in ED  - KCL Powder 2 doses 40meq Q4H   - KCL 10meq/100ml IVPB Q1H  - encourage po intake - K 3.1 likely 2/2 dehydration from vomiting and reduced po intake  - 40meq PO KCl   - s/p 3 1-L boluses NS in ED  - encourage po intake - K 3.1 likely 2/2 dehydration from vomiting and reduced po intake  - 40meq PO KCl x 2 dose, pt refusing PO liquid/ IV riders, BMP in AM  - encourage po intake

## 2018-12-06 NOTE — CONSULT NOTE ADULT - ASSESSMENT
58f with quadriplegia, htn  admitted fever, leukocytosis secondary to   rle cellulitis --nonpurulent  RRT -- hypoxia 91%  although no cough or s/s of pneumonia

## 2018-12-06 NOTE — DISCHARGE NOTE ADULT - PROVIDER TOKENS
FREE:[LAST:[Mercedes],PHONE:[(   )    -],FAX:[(   )    -]] FREE:[LAST:[Mercedes],PHONE:[(   )    -],FAX:[(   )    -]],TOKEN:'[id number]'

## 2018-12-06 NOTE — PROGRESS NOTE ADULT - PROBLEM SELECTOR PLAN 8
- patient takes nitrofurantoin crystals 50 mg daily as prophylaxis  - non formulary, patient will need to bring from home - due cervical spine injury  - s/p cervical fusion surgery at age 15  - frequent repositioning, using heating pad from home for neck pain  - self cath supplies brought from home

## 2018-12-06 NOTE — DISCHARGE NOTE ADULT - PATIENT PORTAL LINK FT
You can access the [facility] Portal, offered by [facility], by registering with the following website: [email or web address]

## 2018-12-06 NOTE — PROGRESS NOTE ADULT - ASSESSMENT
59 yo F PMH spinal cord injury, functional quadriplegic, muscle spasms, overactive bladder, LE edema, autonomic dysreflexia being admitted for RLE cellulitis. 59 yo F PMH spinal cord injury, functional quadriplegic, muscle spasms, overactive bladder, LE edema, autonomic dysreflexia being admitted for RLE cellulitis. Patient complains of 5/10 headache this morning and fever 101.6F rectal.

## 2018-12-06 NOTE — PROGRESS NOTE ADULT - PROBLEM SELECTOR PLAN 4
- stage 1 on right knee  - frequent repositioning  - will order foam cushion for right knee  - wound care consult multifactorial with Fever/Cellulitis  IV Fluids- dilutional  CBC in AM

## 2018-12-06 NOTE — CHART NOTE - NSCHARTNOTEFT_GEN_A_CORE
Resident Rapid Response Note    Patient is a 58y old  Female who presents with a chief complaint of RLE cellulitis (06 Dec 2018 10:14)      Rapid response was called at on this 58y Female patient for  ___________    Patient was seen and examined at the bedside by the rapid response team and primary team.  ___ () at bedside.    Rapid Response Vital Signs:  BP:  HR:  RR:  SpO2: % on   Temp:  FS:    Vital Signs Last 24 Hrs  T(C): 36.8 (06 Dec 2018 04:40), Max: 39.3 (05 Dec 2018 13:00)  T(F): 98.2 (06 Dec 2018 04:40), Max: 102.7 (05 Dec 2018 13:00)  HR: 99 (06 Dec 2018 04:40) (86 - 100)  BP: 110/71 (06 Dec 2018 04:40) (89/66 - 119/69)  BP(mean): --  RR: 18 (06 Dec 2018 04:40) (12 - 18)  SpO2: 93% (06 Dec 2018 04:40) (92% - 97%)    Physical Exam:  General: Well developed, well nourished, NAD  HEENT: NCAT, PERRLA, EOMI bl, moist mucous membranes   Neck: Supple, nontender, no mass  Neurology: A&Ox3, nonfocal, CN II-XII grossly intact, sensation intact, no gait abnormalities   Respiratory: CTA B/L, No W/R/R  CV: RRR, +S1/S2, no murmurs, rubs or gallops  Abdominal: Soft, NT, ND +BSx4  Extremities: No C/C/E, + peripheral pulses  MSK: Normal ROM, no joint erythema or warmth, no joint swelling   Skin: warm, dry, normal color, no obvious rash or abnormal lesions    LABS:                        10.7   9.84  )-----------( 162      ( 06 Dec 2018 09:24 )             31.6     06 Dec 2018 09:24    137    |  105    |  9      ----------------------------<  75     3.1     |  21     |  0.22     Ca    7.6        06 Dec 2018 09:24    TPro  6.2    /  Alb  3.0    /  TBili  1.8    /  DBili  x      /  AST  54     /  ALT  58     /  AlkPhos  110    06 Dec 2018 09:24      Urinalysis Basic - ( 05 Dec 2018 21:23 )    Color: Yellow / Appearance: Turbid / S.020 / pH: x  Gluc: x / Ketone: Large  / Bili: Small / Urobili: 1   Blood: x / Protein: 25 mg/dL / Nitrite: Positive   Leuk Esterase: Trace / RBC: 0-2 /HPF / WBC 0-2   Sq Epi: x / Non Sq Epi: Occasional / Bacteria: Many      CAPILLARY BLOOD GLUCOSE            RADIOLOGY & ADDITIONAL TESTS:      Assessment/Plan:      -Will continue to follow, RN to call if any changes. Resident Rapid Response Note    Patient is a 58y old  Female who presents with a chief complaint of RLE cellulitis (06 Dec 2018 10:14)      Rapid response was called at on this 58y Female patient for difficulty breathing.     Patient was seen and examined at the bedside by the rapid response team and primary team.  ___ () at bedside.    Rapid Response Vital Signs:  BP:   HR:   RR:  SpO2: % on   Temp:  FS:    Vital Signs Last 24 Hrs  T(C): 36.8 (06 Dec 2018 04:40), Max: 39.3 (05 Dec 2018 13:00)  T(F): 98.2 (06 Dec 2018 04:40), Max: 102.7 (05 Dec 2018 13:00)  HR: 99 (06 Dec 2018 04:40) (86 - 100)  BP: 110/71 (06 Dec 2018 04:40) (89/66 - 119/69)  BP(mean): --  RR: 18 (06 Dec 2018 04:40) (12 - 18)  SpO2: 93% (06 Dec 2018 04:40) (92% - 97%)    Physical Exam:  General: Well developed, well nourished, NAD  HEENT: NCAT, PERRLA, EOMI bl, moist mucous membranes   Neck: Supple, nontender, no mass  Neurology: A&Ox3, nonfocal, CN II-XII grossly intact, sensation intact, no gait abnormalities   Respiratory: CTA B/L, No W/R/R  CV: RRR, +S1/S2, no murmurs, rubs or gallops  Abdominal: Soft, NT, ND +BSx4  Extremities: No C/C/E, + peripheral pulses  MSK: Normal ROM, no joint erythema or warmth, no joint swelling   Skin: warm, dry, normal color, no obvious rash or abnormal lesions    LABS:                        10.7   9.84  )-----------( 162      ( 06 Dec 2018 09:24 )             31.6     06 Dec 2018 09:24    137    |  105    |  9      ----------------------------<  75     3.1     |  21     |  0.22     Ca    7.6        06 Dec 2018 09:24    TPro  6.2    /  Alb  3.0    /  TBili  1.8    /  DBili  x      /  AST  54     /  ALT  58     /  AlkPhos  110    06 Dec 2018 09:24      Urinalysis Basic - ( 05 Dec 2018 21:23 )    Color: Yellow / Appearance: Turbid / S.020 / pH: x  Gluc: x / Ketone: Large  / Bili: Small / Urobili: 1   Blood: x / Protein: 25 mg/dL / Nitrite: Positive   Leuk Esterase: Trace / RBC: 0-2 /HPF / WBC 0-2   Sq Epi: x / Non Sq Epi: Occasional / Bacteria: Many      CAPILLARY BLOOD GLUCOSE            RADIOLOGY & ADDITIONAL TESTS:      Assessment/Plan:      -Will continue to follow, RN to call if any changes. Resident Rapid Response Note    Patient is a 58y old Female who presents with a chief complaint of RLE cellulitis (06 Dec 2018 10:14)    Rapid response team, ICU team, and attending at bedside.   Rapid response was called at on this 58y Female patient for difficulty breathing. Patient has a PMH spinal cord injury, functional quadriplegic, muscle spasms, hx of autonomic dysreflexia, LE edema, and overactive bladder admitted for RLE cellulitis.  Patient was complaining of SOB and feeling "overheated". Patient AAOx3, stating that she is burning up and is uncomfortable. Patient has SOB, denies CP or palpitations. Of note, patient was seen this morning, she stated she was feeling better from yesterday.      Patient was seen and examined at the bedside by the rapid response team, ICU PA, and primary team.    Rapid Response Vital Signs:  BP: 133/90  HR: 99  RR: 17  SpO2: 97% on 3L O2  Temp: 101.6, 102.6  FS: 87    EKG: rate 92, NSR    Vital Signs Last 24 Hrs  T(C): 36.8 (06 Dec 2018 04:40), Max: 39.3 (05 Dec 2018 13:00)  T(F): 98.2 (06 Dec 2018 04:40), Max: 102.7 (05 Dec 2018 13:00)  HR: 99 (06 Dec 2018 04:40) (86 - 100)  BP: 110/71 (06 Dec 2018 04:40) (89/66 - 119/69)  BP(mean): --  RR: 18 (06 Dec 2018 04:40) (12 - 18)  SpO2: 93% (06 Dec 2018 04:40) (92% - 97%)    Physical Exam:  General: Well developed, well nourished, NAD  HEENT: NCAT, PERRLA, EOMI bl, moist mucous membranes   Neck: Supple, nontender, no mass  Neurology: A&Ox3, nonfocal, CN II-XII grossly intact, sensation intact, no gait abnormalities   Respiratory: CTA B/L, No W/R/R  CV: RRR, +S1/S2, no murmurs, rubs or gallops  Abdominal: Soft, NT, ND +BSx4  Extremities: No C/C/E, + peripheral pulses  MSK: Normal ROM, no joint erythema or warmth, no joint swelling   Skin: warm, dry, normal color, no obvious rash or abnormal lesions    LABS:                        10.7   9.84  )-----------( 162      ( 06 Dec 2018 09:24 )             31.6     06 Dec 2018 09:24    137    |  105    |  9      ----------------------------<  75     3.1     |  21     |  0.22     Ca    7.6        06 Dec 2018 09:24    TPro  6.2    /  Alb  3.0    /  TBili  1.8    /  DBili  x      /  AST  54     /  ALT  58     /  AlkPhos  110    06 Dec 2018 09:24      Urinalysis Basic - ( 05 Dec 2018 21:23 )    Color: Yellow / Appearance: Turbid / S.020 / pH: x  Gluc: x / Ketone: Large  / Bili: Small / Urobili: 1   Blood: x / Protein: 25 mg/dL / Nitrite: Positive   Leuk Esterase: Trace / RBC: 0-2 /HPF / WBC 0-2   Sq Epi: x / Non Sq Epi: Occasional / Bacteria: Many      Assessment/Plan:    SOB likely secondary to anxiety.    xray ordered.    xanax offered and patient refused.   ABG ordered.  beside echo by ICU done  Motrin PO 1 time dose ordered for fever.     Spoke with Dr. Pryor and agrees with plan.       -Will continue to follow, RN to call if any changes. Resident Rapid Response Note    Patient is a 58y old Female who presents with a chief complaint of RLE cellulitis (06 Dec 2018 10:14)    Rapid response team, ICU team, and attending Dr. Pryor at bedside. Patients sister also at the bedside.  Rapid response was called at on this 58y Female patient for difficulty breathing. Patient has a PMH spinal cord injury, functional quadriplegic, muscle spasms, hx of autonomic dysreflexia, LE edema, and overactive bladder admitted for RLE cellulitis.  Patient was reporting SOB, feeling "overheated". Denies headache, dizziness, chest pain, palpitations, SOB, cough. Note due to paraplegia patient cannot assess/answer ROS from abdomen and below. Of note, patient was seen this morning, she stated she was feeling better from yesterday. She did have elevated temperature this morning just prior to rapid.      Patient was seen and examined at the bedside by the rapid response team, ICU PA, and primary team.    Rapid Response Vital Signs:  BP: 133/90  HR: 99  RR: 17  SpO2: 97% on 3L O2  Temp: 101.6, 102.6  FS: 87    EKG: rate 92, NSR    Vital Signs Last 24 Hrs  T(C): 36.8 (06 Dec 2018 04:40), Max: 39.3 (05 Dec 2018 13:00)  T(F): 98.2 (06 Dec 2018 04:40), Max: 102.7 (05 Dec 2018 13:00)  HR: 99 (06 Dec 2018 04:40) (86 - 100)  BP: 110/71 (06 Dec 2018 04:40) (89/66 - 119/69)  BP(mean): --  RR: 18 (06 Dec 2018 04:40) (12 - 18)  SpO2: 93% (06 Dec 2018 04:40) (92% - 97%)    Physical Exam:  General: Well developed, well nourished, NAD  HEENT: NCAT, PERRLA, EOMI bl, moist mucous membranes   Neck: Supple, nontender, no mass  Neurology: A&Ox3, nonfocal, CN II-XII grossly intact, sensation intact, no gait abnormalities   Respiratory: CTA B/L, No W/R/R  CV: RRR, +S1/S2, no murmurs, rubs or gallops  Abdominal: Soft, NT, ND +BSx4  Extremities: No C/C/E, + peripheral pulses  MSK: Normal ROM, no joint erythema or warmth, no joint swelling   Skin: warm, dry, normal color, no obvious rash or abnormal lesions    LABS:                        10.7   9.84  )-----------( 162      ( 06 Dec 2018 09:24 )             31.6     06 Dec 2018 09:24    137    |  105    |  9      ----------------------------<  75     3.1     |  21     |  0.22     Ca    7.6        06 Dec 2018 09:24    TPro  6.2    /  Alb  3.0    /  TBili  1.8    /  DBili  x      /  AST  54     /  ALT  58     /  AlkPhos  110    06 Dec 2018 09:24      Urinalysis Basic - ( 05 Dec 2018 21:23 )    Color: Yellow / Appearance: Turbid / S.020 / pH: x  Gluc: x / Ketone: Large  / Bili: Small / Urobili: 1   Blood: x / Protein: 25 mg/dL / Nitrite: Positive   Leuk Esterase: Trace / RBC: 0-2 /HPF / WBC 0-2   Sq Epi: x / Non Sq Epi: Occasional / Bacteria: Many      Assessment/Plan:    SOB likely secondary to anxiety. Fever likely 2/2 to Lower extremity cellulitis.  -STAT EKG - no acute changes, compared with admission EKG.  -STAT Chest x ray - prelim appears negative.  -xanax offered and patient refused.   -STAT ABG to check for any CO2 retention vs other that could potentially be related to SOB.  -Beside echo by ICU done, no signs of volume overload.  -STAT Motrin PO 1 time dose ordered for fever. Fever work up in progress, cultures pending.   -Pt refusing K Clor powder for repletion of hypokalemia this AM, will change to 40 mEQ KCl PO x 2 doses every 4 hours, will DC IV K riders.  -Had extensive discussion with RN, Dr. Pryor, and patients sister. Will closely monitor patient, to closely tend to patients needs for straight cath.  -Will follow up in 2 hours or sooner if needed.   -RN to call if any changes. Resident Rapid Response Note    Patient is a 58y old Female who presents with a chief complaint of RLE cellulitis (06 Dec 2018 10:14)    Rapid response team, ICU team, and attending Dr. Pryor at bedside. Patients sister also at the bedside.  Rapid response was called at on this 58y Female patient for difficulty breathing. Patient has a PMH spinal cord injury, functional quadriplegic, muscle spasms, hx of autonomic dysreflexia, LE edema, and overactive bladder admitted for RLE cellulitis.  Patient was reporting SOB, feeling "overheated". Denies headache, dizziness, chest pain, palpitations, SOB, cough. Note due to paraplegia patient cannot assess/answer ROS from abdomen and below. Of note, patient was seen this morning, she stated she was feeling better from yesterday. She did have elevated temperature this morning just prior to rapid.      Patient was seen and examined at the bedside by the rapid response team, ICU PA, and primary team.    Rapid Response Vital Signs:  BP: 133/90  HR: 99  RR: 17  SpO2: 97% on 3L O2  Temp: 101.6, 102.6  FS: 87    EKG: rate 92, NSR    Vital Signs Last 24 Hrs  T(C): 36.8 (06 Dec 2018 04:40), Max: 39.3 (05 Dec 2018 13:00)  T(F): 98.2 (06 Dec 2018 04:40), Max: 102.7 (05 Dec 2018 13:00)  HR: 99 (06 Dec 2018 04:40) (86 - 100)  BP: 110/71 (06 Dec 2018 04:40) (89/66 - 119/69)  BP(mean): --  RR: 18 (06 Dec 2018 04:40) (12 - 18)  SpO2: 93% (06 Dec 2018 04:40) (92% - 97%)    Physical Exam:  General: Well developed, well nourished, NAD  HEENT: NCAT, PERRLA, EOMI bl, dry mucous membranes   Neurology: A&Ox3, nonfocal, patient quadriplegic   Respiratory: CTA B/L, No W/R/R  CV: RRR, +S1/S2, no murmurs, rubs or gallops  Abdominal: Soft, ND +BSx4  Extremities: 1+ non-pitting edema b/l extremities, slight erythema on RLE likely from cellulitis, positive peripheral pulses  Skin: warm, dry, normal color, RLE cellulitis.     LABS:                        10.7   9.84  )-----------( 162      ( 06 Dec 2018 09:24 )             31.6     06 Dec 2018 09:24    137    |  105    |  9      ----------------------------<  75     3.1     |  21     |  0.22     Ca    7.6        06 Dec 2018 09:24    TPro  6.2    /  Alb  3.0    /  TBili  1.8    /  DBili  x      /  AST  54     /  ALT  58     /  AlkPhos  110    06 Dec 2018 09:24      Urinalysis Basic - ( 05 Dec 2018 21:23 )    Color: Yellow / Appearance: Turbid / S.020 / pH: x  Gluc: x / Ketone: Large  / Bili: Small / Urobili: 1   Blood: x / Protein: 25 mg/dL / Nitrite: Positive   Leuk Esterase: Trace / RBC: 0-2 /HPF / WBC 0-2   Sq Epi: x / Non Sq Epi: Occasional / Bacteria: Many      Assessment/Plan:    SOB likely secondary to anxiety. Fever likely 2/2 to Lower extremity cellulitis.  -STAT EKG - no acute changes, compared with admission EKG.  -STAT Chest x ray - prelim appears negative.  -xanax offered and patient refused.   -STAT ABG to check for any CO2 retention vs other that could potentially be related to SOB.  -Beside echo by ICU done, no signs of volume overload.  -STAT Motrin PO 1 time dose ordered for fever. Fever work up in progress, cultures pending.   -Pt refusing K Clor powder for repletion of hypokalemia this AM, will change to 40 mEQ KCl PO x 2 doses every 4 hours, will DC IV K riders.  -Had extensive discussion with RN, Dr. Pryor, and patients sister. Will closely monitor patient, to closely tend to patients needs for straight cath.  -Will follow up in 2 hours or sooner if needed.   -RN to call if any changes.

## 2018-12-06 NOTE — DISCHARGE NOTE ADULT - NSTOBACCOREFERRAL_GEN_A_CS
Refill request received for lo loestrin    Next office visit: 11/13/2018
Patient declined information
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
## 2018-12-06 NOTE — PROGRESS NOTE ADULT - PROBLEM SELECTOR PLAN 7
- holding patient's HCTZ for dehydration and hypokalemia  - continue to monitor for increased LE edema, electrolyte abnormalities - stage 1 on right knee  - frequent repositioning  - will order foam cushion for right knee  - wound care consult
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
## 2018-12-06 NOTE — DISCHARGE NOTE ADULT - PLAN OF CARE
improvement of symptoms you were treated for your cellulitis with an antibiotic.  Please follow up with your doctor within 1 week of hospital discharge. please see your doctor within 1 week of hospital discharge. you were treated for your cellulitis with an antibiotic. Please take the antibiotic as prescribed.   Please follow up with your doctor within 1 week of hospital discharge to monitor the progression/ improvement of the cellulitis. please take hydrochlorothiazide as prescribed. Please follow up with your doctor within 1 week of hospital discharge. you were treated for your cellulitis with an antibiotic. Please take the antibiotic as prescribed. Please take Please take Bacid as prescribed.    Please follow up with your doctor within 1 week of hospital discharge to monitor the progression/ improvement of the cellulitis. on your CT scan, you were found to have a multinodular thyroid. Please follow up with your doctor within 1 week of hospital discharge. on your CT scan, you were found to have a multinodular thyroid. Please follow up with your doctor within 1 week of hospital discharge. Thyroid Function as out pt. you were treated for your cellulitis of Rt Lower EXT  with IV antibiotic.   -Complete  the oral  antibiotic as prescribed.  Augmentin 1 tab 2x day for 6 more days   -Please take Please take Bacid as prescribed.    - follow up with your doctor within 1 week of hospital discharge to monitor the progression/ improvement of the cellulitis. Pt has special chair & HHA in place  -Baclofen 10 mg 4 x day  please see your doctor within 1 week of hospital discharge. 2/2 Water pill HCTZ please see your doctor within 1 week of hospital discharge for BMP   Eat Diet rich in Potassium like Banana, Orange Improving edema with HCTZ . Keep both your leg elevated  please take hydrochlorothiazide as prescribed. Please follow up with your doctor within 1 week of hospital discharge.

## 2018-12-06 NOTE — PROGRESS NOTE ADULT - PROBLEM SELECTOR PLAN 1
- right LE  - with leukocytosis- WBC >17 and fever > 102  - s/p vanc and zosyn in ED  - continue vancomycin IV 1 Gm q 12 hrs   - follow up vanc trough  - patient is not diabetic, will hold Zosyn for now  - ID consult, Dr. Swanson  - patient has diminished UO, likely 2/2 fever and vomiting, s/p 3 1-L boluses  - will give additional 1L bolus, then maintenance fluids at 100 mL/hour  - follow up blood c/s x 2 , urine cultures  - follow up chest Xray  - continue Tylenol suppositories PRN for fever - right LE  - WBC drop from 17.36 --> 9.84  - fever 101.6F rectal - given 650mg PO Tylenol this AM   - continue vancomycin IV 1mg Q12H   - follow up vanc trough  - patient is not diabetic, will hold Zosyn for now  - ID consult, Dr. Swanson  - patient has diminished UO, likely 2/2 fever and vomiting, s/p 3 1-L boluses  - maintenance fluids at 100 mL/hour  - follow up blood c/s x 2 , urine cultures  - continue Tylenol suppositories PRN for fever  -chest xray negative  - US Duplex Venous LE Bilateral negative for DVT - right LE  - WBC drop from 17.36 --> 9.84  - fever 101.6F rectal - given 650mg PO Tylenol this AM   - vancomycin IV 1mg Q12H changed to Unasyn IVPB 3g Q6H  - follow up vanc trough  - patient is not diabetic, will hold Zosyn for now  - ID consult, Dr. Swanson  - patient has diminished UO, likely 2/2 fever and vomiting, s/p 3 1-L boluses  - maintenance fluids at 100 mL/hour  - follow up blood c/s x 2 , urine cultures  - continue Tylenol suppositories PRN for fever  -chest xray negative  - US Duplex Venous LE Bilateral negative for DVT - right LE  - WBC drop from 17.36 --> 9.84  - fever 101.6F rectal - given 650mg PO Tylenol this AM   - D/C vancomycin IV 1mg Q12H   - Start Unasyn IVPB 3g Q6H  - patient is not diabetic, will hold Zosyn for now  - ID consult, Dr. Swanson  - patient has diminished UO, likely 2/2 fever and vomiting, s/p 3 1-L boluses  - maintenance fluids   - follow up blood c/s x 2 , urine cultures  - continue Tylenol suppositories PRN for fever  -chest xray negative  - US Duplex Venous LE Bilateral negative for DVT - right LE cellulitis, Fever 102.6   - WBC improved  from 17.36 --> 9.84  - fever 101.6F rectal - given 650mg PO Tylenol this AM   - D/C vancomycin IV 1mg Q12H   - Start Unasyn IVPB 3g Q6H  - patient is not diabetic,   - ID consult, Dr. Swanson/ Dr Levy  - patient has diminished UO, likely 2/2 fever and vomiting, s/p 3 1-L boluses  - maintenance fluids NS 75 ml /hr  - follow up blood c/s x 2 , urine cultures to follow   - continue Tylenol suppositories PRN for fever  -chest xray negative  - US Duplex Venous LE Bilateral negative for DVT

## 2018-12-06 NOTE — CHART NOTE - NSCHARTNOTEFT_GEN_A_CORE
rapid response follow up  PGY-1    Resident Rapid Response Note    Rapid response was called at 11:30AM for difficulty breathing.      Patient was seen and examined at the bedside by resident. Patient comfortably sitting upright in her bed in a good mood. Patient not complaining of headache, SOB, CP, palpitations, or cough.     Vital Signs Last 24 Hrs  T(C): 38.2 (06 Dec 2018 12:46), Max: 38.7 (06 Dec 2018 10:05)  T(F): 100.7 (06 Dec 2018 12:46), Max: 101.6 (06 Dec 2018 10:05)  HR: 91 (06 Dec 2018 12:46) (86 - 100)  BP: 112/78 (06 Dec 2018 12:46) (93/58 - 119/69)  BP(mean): --  RR: 16 (06 Dec 2018 12:46) (14 - 18)  SpO2: 89% (06 Dec 2018 12:46) (89% - 97%)    Vital Signs Last 24 Hrs  T(C): 38.2 (06 Dec 2018 12:46), Max: 38.7 (06 Dec 2018 10:05)  T(F): 100.7 (06 Dec 2018 12:46), Max: 101.6 (06 Dec 2018 10:05)  HR: 91 (06 Dec 2018 12:46) (86 - 100)  BP: 112/78 (06 Dec 2018 12:46) (93/58 - 119/69)  BP(mean): --  RR: 16 (06 Dec 2018 12:46) (14 - 18)  SpO2: 89% (06 Dec 2018 12:46) (89% - 97%)    Physical Exam:  General: Well developed, well nourished, NAD  HEENT: NCAT, PERRLA, EOMI bl, moist mucous membranes   Neck: Supple, nontender, no mass  Neurology: A&Ox3, quadriplegic    Respiratory: CTA B/L, No W/R/R  CV: RRR, +S1/S2, no murmurs, rubs or gallops  Abdominal: Soft, ND +BSx4  Extremities: nonpitting edema b/l LEs, + peripheral pulses  Skin: warm, dry, normal color, erythematous RLE from cellulitis    STAT chest xray- No acute consolidation. Patchy subsegmental   atelectasis lung bases. There are no pleural effusion or pneumothorax.   Cardiac and mediastinal structures are within normal limits. Osteopenia   and degenerative changes.     Assessment/Plan:  - straight catheter was done, patient's complaints improved.   - Crowell to be placed for 24 hours to manage urgency and/or progression to anxiety and/or autonomic dysreflexia.       Discussed plan with Dr Pryor, no further recommendations.   -Will continue to follow, RN to call if any changes. rapid response follow up  PGY-1    Resident Rapid Response Note    Rapid response was called at 11:30AM for difficulty breathing.      Patient was seen and examined at the bedside by resident. Patient comfortably sitting upright in her bed in a good mood. Patient not complaining of headache, SOB, CP, palpitations, or cough.     Vital Signs Last 24 Hrs  BP: 98.62  HR: 89  95% O2 on RA  Oral temp: 98.6  Rectal temp:      Vital Signs Last 24 Hrs  T(C): 38.2 (06 Dec 2018 12:46), Max: 38.7 (06 Dec 2018 10:05)  T(F): 100.7 (06 Dec 2018 12:46), Max: 101.6 (06 Dec 2018 10:05)  HR: 91 (06 Dec 2018 12:46) (86 - 100)  BP: 112/78 (06 Dec 2018 12:46) (93/58 - 119/69)  BP(mean): --  RR: 16 (06 Dec 2018 12:46) (14 - 18)  SpO2: 89% (06 Dec 2018 12:46) (89% - 97%)    Physical Exam:  General: Well developed, well nourished, NAD  HEENT: NCAT, PERRLA, EOMI bl, moist mucous membranes   Neck: Supple, nontender, no mass  Neurology: A&Ox3, quadriplegic    Respiratory: CTA B/L, No W/R/R  CV: RRR, +S1/S2, no murmurs, rubs or gallops  Abdominal: Soft, ND +BSx4  Extremities: nonpitting edema b/l LEs, + peripheral pulses  Skin: warm, dry, normal color, erythematous RLE from cellulitis    STAT chest xray- No acute consolidation. Patchy subsegmental   atelectasis lung bases. There are no pleural effusion or pneumothorax.   Cardiac and mediastinal structures are within normal limits. Osteopenia   and degenerative changes.     Assessment/Plan:  - straight catheter was done, patient's complaints improved.   - Crowell to be placed for 24 hours to manage urgency and/or progression to anxiety and/or autonomic dysreflexia.       Discussed plan with Dr Pryor, no further recommendations.   -Will continue to follow, RN to call if any changes.

## 2018-12-06 NOTE — DISCHARGE NOTE ADULT - CARE PROVIDER_API CALL
Mercedes,   Phone: (   )    -  Fax: (   )    - Mercedes,   Phone: (   )    -  Fax: (   )    -    Trever Swanson), Infectious Disease; Internal Medicine  [address] [place] [address]  Phone: [phone]  Fax: [phone]

## 2018-12-06 NOTE — PROGRESS NOTE ADULT - PROBLEM SELECTOR PLAN 9
Lovenox for DVT PPX  Famotidine for dyspepsia  bedbound, needs frequent repositioning  Social work consult, patient's sister, who helps provide care, moving to [place]    IMPROVE VTE Individual Risk Assessment          RISK                                                          Points  [  ] Previous VTE                                                3  [  ] Thrombophilia                                             2  [  ] Lower limb paralysis                                   2        (unable to hold up >15 seconds)    [  ] Current Cancer                                             2         (within 6 months)  [  ] Immobilization > 24 hrs                              1  [  ] ICU/CCU stay > 24 hours                             1  [  ] Age > 60                                                         1    IMPROVE VTE Score: 3 - patient takes nitrofurantoin crystals 50 mg daily as prophylaxis  - non formulary, patient will need to bring from home

## 2018-12-06 NOTE — PROGRESS NOTE ADULT - PROBLEM SELECTOR PLAN 6
- with contractures of hands  - continue baclofen - holding patient's HCTZ for dehydration and hypokalemia  - continue to monitor for increased LE edema, electrolyte abnormalities
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
## 2018-12-06 NOTE — PROGRESS NOTE ADULT - PROBLEM SELECTOR PLAN 2
- patient reports repeated episodes of hypertension and bradycardia, with associated headache when she is not able to self cath her bladder as quickly as possible  - patient's sister and aides are trained to perform cath and patient has brought own supplies.   - however, patient's sister needs to leave the state on Friday and will not be available for at least a week  - patient will require call button designed for people with limited dexterity so she can alert nursing staff when she needs to be cath   - straight cath 5x/day - patient had a rapid response this AM likely 2/2 to anxiety.   - temperature 102.6- given Motrin 1 time dose.   - patient offered xanax and patient refused.   - patient reports repeated episodes of hypertension and bradycardia, with associated headache when she is not able to self cath her bladder as quickly as possible  - patient's sister and aides are trained to perform cath and patient has brought own supplies.   - however, patient's sister needs to leave the state on Friday and will not be available for at least a week  - patient will require call button designed for people with limited dexterity so she can alert nursing staff when she needs to be cath   - straight cath 5x/day - patient had a rapid response this AM likely 2/2 to SOB , possible from anxiety.   - temperature 102.6- given Motrin 1 time dose. ABG on RA done  - patient offered xanax and patient refused.   - patient reports repeated episodes of hypertension and bradycardia, with associated headache when she is not able to self cath her bladder as quickly as possible  - patient's sister and aides are trained to perform cath and patient has brought own supplies.   - however, patient's sister needs to leave the state on Friday and will not be available for at least a week  - patient will require call button designed for people with limited dexterity so she can alert nursing staff when she needs to be cath   - straight cath 5x/day, D/W pt , sister & RN , [facility] cath ~24- 48 hrs to avoid above episodes

## 2018-12-06 NOTE — CHART NOTE - NSCHARTNOTEFT_GEN_A_CORE
Called by RN to evaluate patient with SOB. As per patient, felt SOB earlier in the day which was relieved by taking Motrin. Reports feeling SOB when lies flat and on her side. Denies HA, dizziness, fevers, chills, nausea, vomiting, cough, CP, palpitations.     ROS as per HPI    O:  Allergies    No Known Allergies    Intolerances    latex (Unknown)      Vitals  Vital Signs Last 24 Hrs  T(C): 36.7 (06 Dec 2018 21:39), Max: 38.7 (06 Dec 2018 10:05)  T(F): 98.1 (06 Dec 2018 21:39), Max: 101.6 (06 Dec 2018 10:05)  HR: 77 (06 Dec 2018 21:39) (77 - 100)  BP: 108/76 (06 Dec 2018 21:39) (98/62 - 112/78)  BP(mean): --  RR: 16 (06 Dec 2018 21:39) (16 - 18)  SpO2: 93% (06 Dec 2018 21:39) (89% - 95%)      Physical Exam:  General: Well developed, well nourished, NAD  HEENT: NCAT, EOMI bl, moist mucous membranes   Neck: Supple, nontender, no mass  Neurology: A&Ox3, quadriplegic    Respiratory: CTA B/L, No W/R/R  CV: RRR, +S1/S2, no murmurs, rubs or gallops  Abdominal: Soft, ND +BSx4  Extremities: nonpitting edema b/l LEs, + peripheral pulses  Skin: warm, dry, normal color, erythematous RLE from cellulitis       LABS:                        10.7   9.84  )-----------( 162      ( 06 Dec 2018 09:24 )             31.6     12    137  |  105  |  9   ----------------------------<  75  3.1<L>   |  21<L>  |  0.22<L>    Ca    7.6<L>      06 Dec 2018 09:24    TPro  6.2  /  Alb  3.0<L>  /  TBili  1.8<H>  /  DBili  x   /  AST  54<H>  /  ALT  58  /  AlkPhos  110        Urinalysis Basic - ( 05 Dec 2018 21:23 )    Color: Yellow / Appearance: Turbid / S.020 / pH: x  Gluc: x / Ketone: Large  / Bili: Small / Urobili: 1   Blood: x / Protein: 25 mg/dL / Nitrite: Positive   Leuk Esterase: Trace / RBC: 0-2 /HPF / WBC 0-2   Sq Epi: x / Non Sq Epi: Occasional / Bacteria: Many      RADIOLOGY & ADDITIONAL TESTS:  STAT chest xray- No acute consolidation. Patchy subsegmental   atelectasis lung bases. There are no pleural effusion or pneumothorax.   Cardiac and mediastinal structures are within normal limits. Osteopenia   and degenerative changes.       A/P: 58y Female patient for difficulty breathing. Patient has a PMH spinal cord injury, functional quadriplegic, muscle spasms, hx of autonomic dysreflexia, LE edema, and overactive bladder admitted for RLE cellulitis complicated by recurrent SOB  - Due to immobility 2/2 quadriplegia and intermittent SOB will r/o DVT  - noted Rapid response earlier today due to SOB  - CXR as above  - ABG wnl  - f/u CT Angio  - Discussed plan with Dr. Pryor  - RN to call with any changes Called by RN to evaluate patient with SOB. As per patient, felt SOB earlier in the day which was relieved by taking Motrin. Reports feeling SOB when lies flat and on her side. Denies HA, dizziness, fevers, chills, nausea, vomiting, cough, CP, palpitations.     ROS as per HPI    O:  Allergies    No Known Allergies    Intolerances    latex (Unknown)      Vitals  Vital Signs Last 24 Hrs  T(C): 36.7 (06 Dec 2018 21:39), Max: 38.7 (06 Dec 2018 10:05)  T(F): 98.1 (06 Dec 2018 21:39), Max: 101.6 (06 Dec 2018 10:05)  HR: 77 (06 Dec 2018 21:39) (77 - 100)  BP: 108/76 (06 Dec 2018 21:39) (98/62 - 112/78)  BP(mean): --  RR: 16 (06 Dec 2018 21:39) (16 - 18)  SpO2: 93% (06 Dec 2018 21:39) (89% - 95%)      Physical Exam:  General: Well developed, well nourished, NAD  HEENT: NCAT, EOMI bl, moist mucous membranes   Neck: Supple, nontender, no mass  Neurology: A&Ox3, quadriplegic    Respiratory: CTA B/L, No W/R/R  CV: RRR, +S1/S2, no murmurs, rubs or gallops  Abdominal: Soft, ND +BSx4  Extremities: nonpitting edema b/l LEs, + peripheral pulses  Skin: warm, dry, normal color, erythematous RLE from cellulitis       LABS:                        10.7   9.84  )-----------( 162      ( 06 Dec 2018 09:24 )             31.6     12    137  |  105  |  9   ----------------------------<  75  3.1<L>   |  21<L>  |  0.22<L>    Ca    7.6<L>      06 Dec 2018 09:24    TPro  6.2  /  Alb  3.0<L>  /  TBili  1.8<H>  /  DBili  x   /  AST  54<H>  /  ALT  58  /  AlkPhos  110        Urinalysis Basic - ( 05 Dec 2018 21:23 )    Color: Yellow / Appearance: Turbid / S.020 / pH: x  Gluc: x / Ketone: Large  / Bili: Small / Urobili: 1   Blood: x / Protein: 25 mg/dL / Nitrite: Positive   Leuk Esterase: Trace / RBC: 0-2 /HPF / WBC 0-2   Sq Epi: x / Non Sq Epi: Occasional / Bacteria: Many      RADIOLOGY & ADDITIONAL TESTS:  STAT chest xray- No acute consolidation. Patchy subsegmental   atelectasis lung bases. There are no pleural effusion or pneumothorax.   Cardiac and mediastinal structures are within normal limits. Osteopenia   and degenerative changes.       A/P: 58y Female patient for difficulty breathing. Patient has a PMH spinal cord injury, functional quadriplegic, muscle spasms, hx of autonomic dysreflexia, LE edema, and overactive bladder admitted for RLE cellulitis complicated by recurrent SOB  - Due to immobility 2/2 quadriplegia and intermittent SOB will r/o DVT  - noted Rapid response earlier today due to SOB  - CXR as above  - ABG wnl, dopper neg for DVT b/l   - f/u CT Angio  - Discussed plan with Dr. Pryor  - RN to call with any changes

## 2018-12-06 NOTE — DISCHARGE NOTE ADULT - CARE PLAN
Principal Discharge DX:	Cellulitis of lower extremity, unspecified laterality  Goal:	improvement of symptoms  Assessment and plan of treatment:	you were treated for your cellulitis with an antibiotic.  Please follow up with your doctor within 1 week of hospital discharge.  Secondary Diagnosis:	Quadriplegic spinal paralysis  Assessment and plan of treatment:	please see your doctor within 1 week of hospital discharge.  Secondary Diagnosis:	Hypokalemia  Assessment and plan of treatment:	please see your doctor within 1 week of hospital discharge. Principal Discharge DX:	Cellulitis of lower extremity, unspecified laterality  Goal:	improvement of symptoms  Assessment and plan of treatment:	you were treated for your cellulitis with an antibiotic. Please take the antibiotic as prescribed.   Please follow up with your doctor within 1 week of hospital discharge to monitor the progression/ improvement of the cellulitis.  Secondary Diagnosis:	Quadriplegic spinal paralysis  Assessment and plan of treatment:	please see your doctor within 1 week of hospital discharge.  Secondary Diagnosis:	Hypokalemia  Assessment and plan of treatment:	please see your doctor within 1 week of hospital discharge.  Secondary Diagnosis:	Edema  Assessment and plan of treatment:	please take hydrochlorothiazide as prescribed. Please follow up with your doctor within 1 week of hospital discharge. Principal Discharge DX:	Cellulitis of lower extremity, unspecified laterality  Goal:	improvement of symptoms  Assessment and plan of treatment:	you were treated for your cellulitis with an antibiotic. Please take the antibiotic as prescribed. Please take Please take Bacid as prescribed.    Please follow up with your doctor within 1 week of hospital discharge to monitor the progression/ improvement of the cellulitis.  Secondary Diagnosis:	Quadriplegic spinal paralysis  Assessment and plan of treatment:	please see your doctor within 1 week of hospital discharge.  Secondary Diagnosis:	Hypokalemia  Assessment and plan of treatment:	please see your doctor within 1 week of hospital discharge.  Secondary Diagnosis:	Edema  Assessment and plan of treatment:	please take hydrochlorothiazide as prescribed. Please follow up with your doctor within 1 week of hospital discharge.  Secondary Diagnosis:	Multinodular thyroid  Assessment and plan of treatment:	on your CT scan, you were found to have a multinodular thyroid. Please follow up with your doctor within 1 week of hospital discharge. Principal Discharge DX:	Cellulitis of lower extremity, unspecified laterality  Goal:	improvement of symptoms  Assessment and plan of treatment:	you were treated for your cellulitis of Rt Lower EXT  with IV antibiotic.   -Complete  the oral  antibiotic as prescribed.  Augmentin 1 tab 2x day for 6 more days   -Please take Please take Bacid as prescribed.    - follow up with your doctor within 1 week of hospital discharge to monitor the progression/ improvement of the cellulitis.  Secondary Diagnosis:	Quadriplegic spinal paralysis  Assessment and plan of treatment:	Pt has special chair & HHA in place  -Baclofen 10 mg 4 x day  please see your doctor within 1 week of hospital discharge.  Secondary Diagnosis:	Hypokalemia  Assessment and plan of treatment:	2/2 Water pill HCTZ please see your doctor within 1 week of hospital discharge for BMP   Eat Diet rich in Potassium like Banana, Orange  Secondary Diagnosis:	Edema  Assessment and plan of treatment:	Improving edema with HCTZ . Keep both your leg elevated  please take hydrochlorothiazide as prescribed. Please follow up with your doctor within 1 week of hospital discharge.  Secondary Diagnosis:	Multinodular thyroid  Assessment and plan of treatment:	on your CT scan, you were found to have a multinodular thyroid. Please follow up with your doctor within 1 week of hospital discharge. Thyroid Function as out pt.

## 2018-12-06 NOTE — DISCHARGE NOTE ADULT - ADDITIONAL INSTRUCTIONS
please follow up with your doctor within 1 week of hospital discharge.  It is your responsibility to make these appointments.  If at any time you feel that your symptoms get worse, or return, please do not hesitate to call your doctor or go to the emergency room. please follow up with your doctor within 1 week of hospital discharge for Repeat CBC, BMP in 1 week with PMD  It is your responsibility to make these appointments.  If at any time you feel that your symptoms get worse, or return, please do not hesitate to call your doctor or go to the emergency room.

## 2018-12-07 LAB
ALBUMIN SERPL ELPH-MCNC: 3 G/DL — LOW (ref 3.3–5)
ALP SERPL-CCNC: 126 U/L — HIGH (ref 40–120)
ALT FLD-CCNC: 50 U/L — SIGNIFICANT CHANGE UP (ref 12–78)
ANION GAP SERPL CALC-SCNC: 10 MMOL/L — SIGNIFICANT CHANGE UP (ref 5–17)
AST SERPL-CCNC: 34 U/L — SIGNIFICANT CHANGE UP (ref 15–37)
BASOPHILS # BLD AUTO: 0.03 K/UL — SIGNIFICANT CHANGE UP (ref 0–0.2)
BASOPHILS NFR BLD AUTO: 0.3 % — SIGNIFICANT CHANGE UP (ref 0–2)
BILIRUB SERPL-MCNC: 1.4 MG/DL — HIGH (ref 0.2–1.2)
BUN SERPL-MCNC: 7 MG/DL — SIGNIFICANT CHANGE UP (ref 7–23)
CALCIUM SERPL-MCNC: 8.3 MG/DL — LOW (ref 8.5–10.1)
CHLORIDE SERPL-SCNC: 109 MMOL/L — HIGH (ref 96–108)
CO2 SERPL-SCNC: 21 MMOL/L — LOW (ref 22–31)
CREAT SERPL-MCNC: 0.22 MG/DL — LOW (ref 0.5–1.3)
EOSINOPHIL # BLD AUTO: 0.05 K/UL — SIGNIFICANT CHANGE UP (ref 0–0.5)
EOSINOPHIL NFR BLD AUTO: 0.5 % — SIGNIFICANT CHANGE UP (ref 0–6)
GLUCOSE SERPL-MCNC: 59 MG/DL — LOW (ref 70–99)
HCT VFR BLD CALC: 34.4 % — LOW (ref 34.5–45)
HGB BLD-MCNC: 11.5 G/DL — SIGNIFICANT CHANGE UP (ref 11.5–15.5)
IMM GRANULOCYTES NFR BLD AUTO: 0.8 % — SIGNIFICANT CHANGE UP (ref 0–1.5)
LYMPHOCYTES # BLD AUTO: 0.42 K/UL — LOW (ref 1–3.3)
LYMPHOCYTES # BLD AUTO: 4.3 % — LOW (ref 13–44)
MCHC RBC-ENTMCNC: 29.3 PG — SIGNIFICANT CHANGE UP (ref 27–34)
MCHC RBC-ENTMCNC: 33.4 GM/DL — SIGNIFICANT CHANGE UP (ref 32–36)
MCV RBC AUTO: 87.5 FL — SIGNIFICANT CHANGE UP (ref 80–100)
MONOCYTES # BLD AUTO: 0.73 K/UL — SIGNIFICANT CHANGE UP (ref 0–0.9)
MONOCYTES NFR BLD AUTO: 7.4 % — SIGNIFICANT CHANGE UP (ref 2–14)
NEUTROPHILS # BLD AUTO: 8.57 K/UL — HIGH (ref 1.8–7.4)
NEUTROPHILS NFR BLD AUTO: 86.7 % — HIGH (ref 43–77)
PLATELET # BLD AUTO: 182 K/UL — SIGNIFICANT CHANGE UP (ref 150–400)
POTASSIUM SERPL-MCNC: 4.1 MMOL/L — SIGNIFICANT CHANGE UP (ref 3.5–5.3)
POTASSIUM SERPL-SCNC: 4.1 MMOL/L — SIGNIFICANT CHANGE UP (ref 3.5–5.3)
PROT SERPL-MCNC: 6.7 G/DL — SIGNIFICANT CHANGE UP (ref 6–8.3)
RBC # BLD: 3.93 M/UL — SIGNIFICANT CHANGE UP (ref 3.8–5.2)
RBC # FLD: 13.6 % — SIGNIFICANT CHANGE UP (ref 10.3–14.5)
SODIUM SERPL-SCNC: 140 MMOL/L — SIGNIFICANT CHANGE UP (ref 135–145)
WBC # BLD: 9.88 K/UL — SIGNIFICANT CHANGE UP (ref 3.8–10.5)
WBC # FLD AUTO: 9.88 K/UL — SIGNIFICANT CHANGE UP (ref 3.8–10.5)

## 2018-12-07 PROCEDURE — 71275 CT ANGIOGRAPHY CHEST: CPT | Mod: 26

## 2018-12-07 PROCEDURE — 93010 ELECTROCARDIOGRAM REPORT: CPT

## 2018-12-07 RX ORDER — IBUPROFEN 200 MG
400 TABLET ORAL ONCE
Qty: 0 | Refills: 0 | Status: COMPLETED | OUTPATIENT
Start: 2018-12-07 | End: 2018-12-08

## 2018-12-07 RX ORDER — LACTOBACILLUS ACIDOPHILUS 100MM CELL
1 CAPSULE ORAL
Qty: 28 | Refills: 0 | OUTPATIENT
Start: 2018-12-07 | End: 2018-12-20

## 2018-12-07 RX ORDER — SENNA PLUS 8.6 MG/1
2 TABLET ORAL
Qty: 0 | Refills: 0 | COMMUNITY
Start: 2018-12-07

## 2018-12-07 RX ORDER — FUROSEMIDE 40 MG
10 TABLET ORAL ONCE
Qty: 0 | Refills: 0 | Status: COMPLETED | OUTPATIENT
Start: 2018-12-07 | End: 2018-12-07

## 2018-12-07 RX ORDER — ALPRAZOLAM 0.25 MG
0.25 TABLET ORAL ONCE
Qty: 0 | Refills: 0 | Status: DISCONTINUED | OUTPATIENT
Start: 2018-12-07 | End: 2018-12-07

## 2018-12-07 RX ORDER — IBUPROFEN 200 MG
600 TABLET ORAL ONCE
Qty: 0 | Refills: 0 | Status: COMPLETED | OUTPATIENT
Start: 2018-12-07 | End: 2018-12-07

## 2018-12-07 RX ORDER — DOCUSATE SODIUM 100 MG
1 CAPSULE ORAL
Qty: 0 | Refills: 0 | COMMUNITY
Start: 2018-12-07

## 2018-12-07 RX ADMIN — AMPICILLIN SODIUM AND SULBACTAM SODIUM 200 GRAM(S): 250; 125 INJECTION, POWDER, FOR SUSPENSION INTRAMUSCULAR; INTRAVENOUS at 06:08

## 2018-12-07 RX ADMIN — Medication 10 MILLIGRAM(S): at 17:42

## 2018-12-07 RX ADMIN — Medication 10 MILLIGRAM(S): at 10:17

## 2018-12-07 RX ADMIN — ENOXAPARIN SODIUM 40 MILLIGRAM(S): 100 INJECTION SUBCUTANEOUS at 11:50

## 2018-12-07 RX ADMIN — Medication 10 MILLIGRAM(S): at 00:50

## 2018-12-07 RX ADMIN — AMPICILLIN SODIUM AND SULBACTAM SODIUM 200 GRAM(S): 250; 125 INJECTION, POWDER, FOR SUSPENSION INTRAMUSCULAR; INTRAVENOUS at 17:42

## 2018-12-07 RX ADMIN — AMPICILLIN SODIUM AND SULBACTAM SODIUM 200 GRAM(S): 250; 125 INJECTION, POWDER, FOR SUSPENSION INTRAMUSCULAR; INTRAVENOUS at 00:50

## 2018-12-07 RX ADMIN — Medication 10 MILLIGRAM(S): at 11:50

## 2018-12-07 RX ADMIN — Medication 2.5 MILLIGRAM(S): at 17:43

## 2018-12-07 RX ADMIN — Medication 650 MILLIGRAM(S): at 23:36

## 2018-12-07 RX ADMIN — Medication 10 MILLIGRAM(S): at 06:08

## 2018-12-07 RX ADMIN — FAMOTIDINE 20 MILLIGRAM(S): 10 INJECTION INTRAVENOUS at 17:42

## 2018-12-07 RX ADMIN — Medication 600 MILLIGRAM(S): at 18:30

## 2018-12-07 RX ADMIN — Medication 100 MILLIGRAM(S): at 06:08

## 2018-12-07 RX ADMIN — FAMOTIDINE 20 MILLIGRAM(S): 10 INJECTION INTRAVENOUS at 06:08

## 2018-12-07 RX ADMIN — AMPICILLIN SODIUM AND SULBACTAM SODIUM 200 GRAM(S): 250; 125 INJECTION, POWDER, FOR SUSPENSION INTRAMUSCULAR; INTRAVENOUS at 11:50

## 2018-12-07 RX ADMIN — Medication 2.5 MILLIGRAM(S): at 06:08

## 2018-12-07 RX ADMIN — Medication 650 MILLIGRAM(S): at 22:01

## 2018-12-07 NOTE — PROGRESS NOTE ADULT - PROBLEM SELECTOR PLAN 1
If discharged, 7 day course of antibiotics completed with PO Augmentin 875mg PO Q12H.  Patient should follow up with her physician to assess adequacy of treatment.  I will not see her in the office as she is leaving against my advice presently.

## 2018-12-07 NOTE — PROGRESS NOTE ADULT - PROBLEM SELECTOR PLAN 3
- K 4.1 today - repleted with 40meq PO KCl x 2 dose. Previously K 3.1 on 12/6 likely 2/2 dehydration from vomiting and reduced po intake  - encourage po intake stable today  Previously K 3.1 on 12/6 likely 2/2 dehydration from vomiting and reduced po intake  - encourage po intake

## 2018-12-07 NOTE — PROGRESS NOTE ADULT - PROBLEM SELECTOR PLAN 1
- right LE cellulitis, Fever 100.2F rectal this AM  - WBC improved  from 17.36 --> 9.88  - continue Tylenol suppositories PRN for fever  - D/C vancomycin IV 1mg Q12H   - Start Unasyn IVPB 3g Q6H  - patient is not diabetic,   - ID consult, Dr. Swanson/ Dr Levy  - patient has diminished UO, likely 2/2 fever and vomiting, s/p 3 1-L boluses  - maintenance fluids NS 75 ml /hr  - follow up blood c/s x 2: pending  - urine culture: No growth  -chest xray negative  - US Duplex Venous LE Bilateral negative for DVT - right LE cellulitis, Fever 100.2F rectal this AM  - WBC improved  from 17.36 --> 9.88  - continue Tylenol suppositories PRN for fever  - D/C vancomycin IV 1mg Q12H   - Start Unasyn IVPB 3g Q6H  - patient is not diabetic,   - ID consult, Dr. Swanson/ Dr Levy  - patient has diminished UO, likely 2/2 fever and vomiting, s/p 3 1-L boluses  - follow up blood c/s x 2: pending  - urine culture: No growth  -chest xray negative  - US Duplex Venous LE Bilateral negative for DVT  - patient asking to leave AMA as she does not feel comfortable without her home aid or sister to take care of her special needs to prevent autonomic dysreflexia. patient was advices about the risk of leaving AMA and educated on the importance of continuing monitor of her fever and infection at the hospital, patient still refusing. - right LE cellulitis, Fever 100.2F rectal this AM  - WBC improved  from 17.36 --> 9.88  - continue Tylenol suppositories PRN for fever  -- Start Unasyn IVPB 3g Q6H  - D/C vancomycin   - patient is not diabetic,   - ID consult, Dr. Swanson at detail, Pt is on IV Abx, Pt Wants to go home Today, Dr Swanson Explained at detail to pt & Brother need to stay in hospital, IV Abx, recurrence of  Fever, worsening S/S , sepsis Ect, pt will take 6 PM dose ofIV Abx & will leave hospital AMA.  - patient got IV Lasix this Am with Improved urine out put.  - follow up blood c/s x 2: NEG so far  - urine culture: No growth  -chest xray negative  - US Duplex Venous LE Bilateral negative for DVT  - patient asking to leave AMA as she does not feel comfortable without her home aid or sister to take care of her special needs to prevent autonomic dysreflexia. patient was advices about the risk of leaving AMA and educated on the importance of continuing monitor of her fever and infection at the hospital, patient still refusing.

## 2018-12-07 NOTE — PROGRESS NOTE ADULT - SUBJECTIVE AND OBJECTIVE BOX
[facility], Division of Infectious Diseases  FLAKITO Flanagan A. Lee  [phone]    Name: CECY BATISTA  Age: 58y  Gender: Female  MRN: [id number]    Interval History--  Notes reviewed. Seen earlier today. S/W D/W Dr. BONNIE Pryor and her team.  Patient stating she is leaving hospital today.   Has numerous complaints, lasix, angiogram, not being cleaned promptly, presence of urinary catheter, not being turned.      Past Medical History--  Quadriplegic spinal paralysis  Hypotension  Autonomic dysreflexia  Muscle spasm  Spinal cord compression  Water retention  S/P cervical spinal fusion      For details regarding the patient's social history, family history, and other miscellaneous elements, please refer the initial infectious diseases consultation and/or the admitting history and physical examination for this admission.    Allergies    No Known Allergies    Intolerances    latex (Unknown)      Medications--  Antibiotics:  ampicillin/sulbactam  IVPB 3 Gram(s) IV Intermittent every 6 hours  ampicillin/sulbactam  IVPB      nitrofurantoin macrocrystals (MACRODANTIN) 50 milliGRAM(s) Oral daily    Immunologic:  influenza   Vaccine 0.5 milliLiter(s) IntraMuscular once    Other:  acetaminophen  Suppository .. PRN  baclofen  bisacodyl Suppository PRN  docusate sodium  enoxaparin Injectable  famotidine    Tablet  oxybutynin  senna      Review of Systems--  A 10-point review of systems was obtained.   Review of systems otherwise negative except as previously noted.    Physical Examination--  T(C): 36.6 (12-07-18 @ 09:45), Max: 37.9 (12-06-18 @ 17:16)  HR: 101 (12-07-18 @ 09:45) (77 - 101)  BP: 93/59 (12-07-18 @ 09:45) (93/59 - 108/76)  RR: 18 (12-07-18 @ 09:45) (16 - 18)  SpO2: 95% (12-07-18 @ 09:45) (93% - 95%)  Wt(kg): --  General: Nontoxic appearing, agitated, anxious woman in no acute distress   Ext: Involved extremity: Right Lower--   Proximal lymphadenopathy: absent.   Lymphangitis: absent.   Tenderness: insensate  Calor: mild  Edema:3+  Erythema: present        Intensity: mild       Extent: patchy above ankle to below knee       Blanching: yes  Crepitus: absent.   Fluctuance: absent.   Devitalized tissue: absent.   Anesthesia: unable, insensate  Bullae: absent.   Ulceration: absent.   Drainage: absent.   Odor: absent.   Pulses: not palpable in foot due to edema, but perfusion to foot appears preserved.  Otherwise No cyanosis, clubbing or edema.      Laboratory Studies--  CBC                        11.5   9.88  )-----------( 182      ( 07 Dec 2018 07:51 )             34.4       Chemistries  12-07    140  |  109<H>  |  7   ----------------------------<  59<L>  4.1   |  21<L>  |  0.22<L>    Ca    8.3<L>      07 Dec 2018 07:51    TPro  6.7  /  Alb  3.0<L>  /  TBili  1.4<H>  /  DBili  x   /  AST  34  /  ALT  50  /  AlkPhos  126<H>  12-07      Culture Data    Culture - Urine (collected 06 Dec 2018 00:25)  Source: .Urine Catheterized  Final Report (06 Dec 2018 22:51):    No growth    Culture - Blood (collected 05 Dec 2018 21:48)  Source: .Blood Blood  Preliminary Report (06 Dec 2018 22:01):    No growth to date.    Culture - Blood (collected 05 Dec 2018 21:48)  Source: .Blood Blood  Preliminary Report (06 Dec 2018 22:01):    No growth to date.

## 2018-12-07 NOTE — PROGRESS NOTE ADULT - ASSESSMENT
57 yo F PMH spinal cord injury, functional quadriplegic, muscle spasms, overactive bladder, LE edema, autonomic dysreflexia being admitted for RLE cellulitis. Patient complains of SOB and difficulty breathing this morning with 2+ pitting edema in b/l lower extremity R>L. 57 yo F PMH spinal cord injury, functional quadriplegic, muscle spasms, overactive bladder, LE edema, autonomic dysreflexia being admitted for RLE cellulitis. Patient complains of SOB and difficulty breathing this morning with 2+ pitting edema in b/l lower extremity R>L. Patient requesting to leave AMA. Patient feels that she does not have adequate supplies and support in hospital. States she has a special chair she sits in which cannot be brought here.

## 2018-12-07 NOTE — PROGRESS NOTE ADULT - ASSESSMENT
59 yo F PMH spinal cord injury, functional quadriplegic, muscle spasms, overactive bladder, LE edema, autonomic dysreflexia being admitted for RLE cellulitis. Patient complains of 5/10 headache this morning and fever 101.6F rectal.

## 2018-12-07 NOTE — PROGRESS NOTE ADULT - PROBLEM SELECTOR PLAN 1
- right LE cellulitis, Fever 102.6   - WBC improved  from 17.36 --> 9.84  - fever 101.6F rectal - given 650mg PO Tylenol this AM   - D/C vancomycin IV 1mg Q12H   - Start Unasyn IVPB 3g Q6H  - patient is not diabetic,   - ID consult, Dr. Swanson/ Dr Levy  - patient has diminished UO, likely 2/2 fever and vomiting, s/p 3 1-L boluses  - maintenance fluids NS 75 ml /hr  - follow up blood c/s x 2 , urine cultures to follow   - continue Tylenol suppositories PRN for fever  -chest xray negative  - US Duplex Venous LE Bilateral negative for DVT

## 2018-12-07 NOTE — PROGRESS NOTE ADULT - PROBLEM SELECTOR PLAN 7
- stage 1 on right knee  - frequent repositioning  - will order foam cushion for right knee  - wound care consult - stage 1 on right knee  - frequent repositioning  - will order foam cushion for right knee  - wound care following.

## 2018-12-07 NOTE — PROGRESS NOTE ADULT - SUBJECTIVE AND OBJECTIVE BOX
Patient is a 58y old  Female who presents with a chief complaint of RLE cellulitis (06 Dec 2018 15:33)      INTERVAL HPI:      OVERNIGHT EVENTS:    Home Medications:  baclofen 10 mg oral tablet: orally 4 times a day (05 Dec 2018 20:30)  Cranberry oral tablet: orally once a day (05 Dec 2018 20:30)  hydroCHLOROthiazide 50 mg oral tablet: 1 tab(s) orally once a day (05 Dec 2018 20:30)  nitrofurantoin macrocrystals 50 mg oral capsule: orally once a day (05 Dec 2018 20:30)  oxybutynin 5 mg oral tablet: 0.25 tab(s) orally 3 times a day (05 Dec 2018 20:30)  raNITIdine 150 mg oral capsule: 1 cap(s) orally 2 times a day (05 Dec 2018 20:30)      MEDICATIONS  (STANDING):  ampicillin/sulbactam  IVPB 3 Gram(s) IV Intermittent every 6 hours  ampicillin/sulbactam  IVPB      baclofen 10 milliGRAM(s) Oral four times a day  docusate sodium 100 milliGRAM(s) Oral three times a day  enoxaparin Injectable 40 milliGRAM(s) SubCutaneous daily  famotidine    Tablet 20 milliGRAM(s) Oral two times a day  influenza   Vaccine 0.5 milliLiter(s) IntraMuscular once  nitrofurantoin macrocrystals (MACRODANTIN) 50 milliGRAM(s) Oral daily  oxybutynin 2.5 milliGRAM(s) Oral two times a day  senna 2 Tablet(s) Oral at bedtime    MEDICATIONS  (PRN):  acetaminophen  Suppository .. 650 milliGRAM(s) Rectal every 6 hours PRN Temp greater or equal to 38C (100.4F)  bisacodyl Suppository 10 milliGRAM(s) Rectal daily PRN Constipation      Allergies    No Known Allergies    Intolerances    latex (Unknown)      REVIEW OF SYSTEMS:  CONSTITUTIONAL: No fever, No chills, No fatigue, No myalgia, No Body ache, No Weakness  EYES: No eye pain,  No visual disturbances, No discharge, NO Redness  ENMT:  No ear pain, No nose bleed, No vertigo; No sinus or throat pain, No Congestion  NECK: No pain, No stiffness  RESPIRATORY: No cough, wheezing, No  hemoptysis, No shortness of breath  CARDIOVASCULAR: No chest pain, palpitations  GASTROINTESTINAL: No abdominal or epigastric pain. No nausea, No vomiting; No diarrhea or constipation. [  ] BM  GENITOURINARY: No dysuria, No frequency, No urgency, No hematuria, or incontinence  NEUROLOGICAL: No headaches, No dizziness, No numbness, No tingling, No tremors, No weakness  EXT: No Swelling, No Pain, No Edema  SKIN:  [  ] No itching, burning, rashes, or lesions   MUSCULOSKELETAL: No joint pain or swelling; No muscle pain, No back pain, No extremity pain  PSYCHIATRIC: No depression, anxiety, mood swings or difficulty sleeping at night  PAIN SCALE: [  ] None  [  ] Other-  ROS Unable to obtain due to - [  ] Dementia  [  ] Lethargy  [  ] Sedated [  ] non verbal  REST OF REVIEW Of SYSTEM - [  ] Normal     Vital Signs Last 24 Hrs  T(C): 36.6 (07 Dec 2018 09:45), Max: 38.3 (06 Dec 2018 14:12)  T(F): 97.9 (07 Dec 2018 09:45), Max: 101 (06 Dec 2018 14:12)  HR: 101 (07 Dec 2018 09:45) (77 - 101)  BP: 93/59 (07 Dec 2018 09:45) (93/59 - 112/78)  BP(mean): --  RR: 18 (07 Dec 2018 09:45) (16 - 18)  SpO2: 95% (07 Dec 2018 09:45) (89% - 95%)  Finger Stick         @ :  -   @ 07:00  --------------------------------------------------------  IN: 0 mL / OUT: 600 mL / NET: -600 mL     @ 07:  -   @ 12:18  --------------------------------------------------------  IN: 100 mL / OUT: 1400 mL / NET: -1300 mL        PHYSICAL EXAM:  GENERAL:  [  ] NAD , [  ] well appearing, [  ] Agitated, [  ] Drowsy,  [  ] Lethargy, [  ] confused   HEAD:  [  ] Normal, [  ] Other  EYES:  [  ] EOMI, [  ] PERRLA, [  ] conjunctiva and sclera clear normal, [  ] Other,  [  ] Pallor,[  ] Discharge  ENMT:  [  ] Normal, [  ] Moist mucous membranes, [  ] Good dentition, [  ] No Thrush  NECK:  [  ] Supple, [  ] No JVD, [  ] Normal thyroid, [  ] Lymphadenopathy [  ] Other  CHEST/LUNG:  [  ] Clear to auscultation bilaterally, [  ] Breath Sounds equal OR Decrease,  [  ] No rales, [  ] No rhonchi  [  ]  No wheezing  HEART:  [  ] Regular rate and rhythm, [  ] tachycardia, [  ] Bradycardia,  [  ] irregular  [  ] No murmurs, No rubs, No gallops, [  ] PPM in place (Mfr:  )  ABDOMEN:  [  ] Soft, [  ] Nontender, [  ] Nondistended, [  ] No mass, [  ] Bowel sounds present, [  ] obese  NERVOUS SYSTEM:  [  ] Alert & Oriented X3, [  ] Nonfocal  [  ] Confusion  [  ] Encephalopathic [  ] Sedated [  ] Unable to assess, [  ] Other-  EXTREMITIES: [  ] 2+ Peripheral Pulses, No clubbing, No cyanosis,  [  ] edema B/L lower EXT. [  ] PVD stasis skin changes B/L Lower EXT  LYMPH: No lymphadenopathy noted  SKIN:  [  ] No rashes or lesions, [  ] Pressure Ulcers, [  ] ecchymosis, [  ] Skin Tears, [  ] Other    DIET:     LABS:                        11.5   9.88  )-----------( 182      ( 07 Dec 2018 07:51 )             34.4     07 Dec 2018 07:51    140    |  109    |  7      ----------------------------<  59     4.1     |  21     |  0.22     Ca    8.3        07 Dec 2018 07:51    TPro  6.7    /  Alb  3.0    /  TBili  1.4    /  DBili  x      /  AST  34     /  ALT  50     /  AlkPhos  126    07 Dec 2018 07:51      Urinalysis Basic - ( 05 Dec 2018 21:23 )    Color: Yellow / Appearance: Turbid / S.020 / pH: x  Gluc: x / Ketone: Large  / Bili: Small / Urobili: 1   Blood: x / Protein: 25 mg/dL / Nitrite: Positive   Leuk Esterase: Trace / RBC: 0-2 /HPF / WBC 0-2   Sq Epi: x / Non Sq Epi: Occasional / Bacteria: Many        Culture Results:   No growth ( @ 00:25)  Culture Results:   No growth to date. ( @ 21:48)  Culture Results:   No growth to date. ( @ 21:48)      culture blood  -- .Urine Catheterized  @ 00:25    culture urine  --   00:25  culture blood  -- .Blood Blood  @ 21:48    culture urine  --   @ 21:48              Culture - Urine (collected 06 Dec 2018 00:25)  Source: .Urine Catheterized  Final Report (06 Dec 2018 22:51):    No growth    Culture - Blood (collected 05 Dec 2018 21:48)  Source: .Blood Blood  Preliminary Report (06 Dec 2018 22:01):    No growth to date.    Culture - Blood (collected 05 Dec 2018 21:48)  Source: .Blood Blood  Preliminary Report (06 Dec 2018 22:01):    No growth to date.         Anemia Panel:      Thyroid Panel:                RADIOLOGY & ADDITIONAL TESTS:      HEALTH ISSUES - PROBLEM Dx:  Anemia: Anemia  Need for prophylactic measure: Need for prophylactic measure  UTI (urinary tract infection): UTI (urinary tract infection)  Water retention: Water retention  Muscle spasm: Muscle spasm  Spinal cord compression: Spinal cord compression  Pressure ulcer: Pressure ulcer  Hypokalemia: Hypokalemia  Autonomic dysreflexia: Autonomic dysreflexia  Cellulitis of lower extremity, unspecified laterality: Cellulitis of lower extremity, unspecified laterality          Consultant(s) Notes Reviewed:  [  ] YES     Care Discussed with [X] Consultants  [  ] Patient  [  ] Family  [  ]   [  ] Social Service  [  ] RN, [  ] Physical Therapy  DVT PPX: [  ] Lovenox, [  ] S C Heparin, [  ] Coumadin, [  ] Xarelto, [  ] Eliquis, [  ] Pradaxa, [  ] IV Heparin drip, [  ] SCD [  ] Contraindication 2 to GI Bleed,[  ] Ambulation  Advanced directive: [  ] None, [  ] DNR/DNI

## 2018-12-07 NOTE — PROGRESS NOTE ADULT - PROBLEM SELECTOR PLAN 3
- K 3.1 likely 2/2 dehydration from vomiting and reduced po intake  - 40meq PO KCl x 2 dose, pt refusing PO liquid/ IV riders, BMP in AM  - encourage po intake
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
## 2018-12-07 NOTE — PROVIDER CONTACT NOTE (OTHER) - SITUATION
Pt has a rectal temp of 101.3 and also patient concerned tat her cellulitis is progressing to her upper thigh

## 2018-12-07 NOTE — PROGRESS NOTE ADULT - PROBLEM SELECTOR PLAN 4
multifactorial with Fever/Cellulitis  IV Fluids- dilutional  CBC in AM multifactorial with Fever/Cellulitis  IV Fluids- dilutional  Hgb 11.5

## 2018-12-07 NOTE — PROGRESS NOTE ADULT - PROBLEM SELECTOR PLAN 9
- patient takes nitrofurantoin crystals 50 mg daily as prophylaxis  - non formulary, patient will need to bring from home

## 2018-12-07 NOTE — PROGRESS NOTE ADULT - PROBLEM SELECTOR PLAN 8
- due cervical spine injury  - s/p cervical fusion surgery at age 15  - frequent repositioning, using heating pad from home for neck pain  - self cath supplies brought from home

## 2018-12-07 NOTE — PROGRESS NOTE ADULT - PROBLEM SELECTOR PLAN 6
- holding patient's HCTZ for dehydration and hypokalemia  - continue to monitor for increased LE edema, electrolyte abnormalities patient takes 5omg HCTZ for her edema  will give 25mg HCTZ when needed for extremity edema and SOB after getting vitals signs and evaluating to see if warranted.  - continue to monitor for increased LE edema, electrolyte abnormalities

## 2018-12-07 NOTE — PROGRESS NOTE ADULT - PROBLEM SELECTOR PLAN 2
- patient had a rapid response on 12/6 likely 2/2 to SOB , possible from anxiety.   - patient offered xanax and patient refused.   - patient reports repeated episodes of hypertension and bradycardia, with associated headache when she is not able to self cath her bladder as quickly as possible  - patient's sister and aides are trained to perform cath and patient has brought own supplies.   - however, patient's sister needs to leave the state on Friday and will not be available for at least a week  - patient given call button designed for people with limited dexterity so she can alert nursing staff when she needs to be cath   - straight cath 5x/day, D/W pt , sister & RN , [facility]ell cath ~24- 48 hrs to avoid above episodes - patient had a rapid response on 12/6 likely 2/2 to SOB , possible from anxiety.   - patient offered xanax and patient refused.   - patient reports repeated episodes of hypertension and bradycardia, with associated headache when she is not able to self cath her bladder as quickly as possible  - patient's sister and aides are trained to perform cath and patient has brought own supplies.   - patient given call button designed for people with limited dexterity so she can alert nursing staff when she needs to be helped with anything  - straight cath 5x/day, D/W pt , sister & RN , [facility]ell cath ~24- 48 hrs to avoid above episodes

## 2018-12-07 NOTE — PROGRESS NOTE ADULT - ASSESSMENT
58f with quadriplegia, htn  admitted fever, leukocytosis secondary to   rle cellulitis --nonpurulent  CTA no PE  Breathing better with lasix.  Patient very concerned re: dysautonomia and catheter staying in place    Discussed with patient and family at bedside bluntly and in layman's terms (depsite that they are medically sophisticated). While she appears to be improving, she still had fever in the last 24h, still had SOB <24h ago, and still has active inflammatory changes in her right leg. I  am worried that, especially given absent sensation, that she is at higher risk than average for complications. I would like to see further improvement and her afebrile for at minimum 24h and preferably 48h before changing her to oral antibiotics. She refuses. I cannot advocate discharge at this time. If she demands discharge today it is against my medical advice.

## 2018-12-07 NOTE — PROGRESS NOTE ADULT - PROBLEM SELECTOR PLAN 2
- patient had a rapid response this AM likely 2/2 to SOB , possible from anxiety.   - temperature 102.6- given Motrin 1 time dose. ABG on RA done  - patient offered xanax and patient refused.   - patient reports repeated episodes of hypertension and bradycardia, with associated headache when she is not able to self cath her bladder as quickly as possible  - patient's sister and aides are trained to perform cath and patient has brought own supplies.   - however, patient's sister needs to leave the state on Friday and will not be available for at least a week  - patient will require call button designed for people with limited dexterity so she can alert nursing staff when she needs to be cath   - straight cath 5x/day, D/W pt , sister & RN , [facility]ell cath ~24- 48 hrs to avoid above episodes

## 2018-12-07 NOTE — PROGRESS NOTE ADULT - PROBLEM SELECTOR PLAN 6
- holding patient's HCTZ for dehydration and hypokalemia  - continue to monitor for increased LE edema, electrolyte abnormalities

## 2018-12-08 LAB
ALBUMIN SERPL ELPH-MCNC: 2.5 G/DL — LOW (ref 3.3–5)
ALP SERPL-CCNC: 111 U/L — SIGNIFICANT CHANGE UP (ref 40–120)
ALT FLD-CCNC: 37 U/L — SIGNIFICANT CHANGE UP (ref 12–78)
ANION GAP SERPL CALC-SCNC: 9 MMOL/L — SIGNIFICANT CHANGE UP (ref 5–17)
AST SERPL-CCNC: 21 U/L — SIGNIFICANT CHANGE UP (ref 15–37)
BASOPHILS # BLD AUTO: 0.02 K/UL — SIGNIFICANT CHANGE UP (ref 0–0.2)
BASOPHILS NFR BLD AUTO: 0.3 % — SIGNIFICANT CHANGE UP (ref 0–2)
BILIRUB SERPL-MCNC: 1.1 MG/DL — SIGNIFICANT CHANGE UP (ref 0.2–1.2)
BUN SERPL-MCNC: 6 MG/DL — LOW (ref 7–23)
CALCIUM SERPL-MCNC: 7.7 MG/DL — LOW (ref 8.5–10.1)
CHLORIDE SERPL-SCNC: 105 MMOL/L — SIGNIFICANT CHANGE UP (ref 96–108)
CO2 SERPL-SCNC: 25 MMOL/L — SIGNIFICANT CHANGE UP (ref 22–31)
CREAT SERPL-MCNC: 0.22 MG/DL — LOW (ref 0.5–1.3)
EOSINOPHIL # BLD AUTO: 0.09 K/UL — SIGNIFICANT CHANGE UP (ref 0–0.5)
EOSINOPHIL NFR BLD AUTO: 1.5 % — SIGNIFICANT CHANGE UP (ref 0–6)
GLUCOSE SERPL-MCNC: 78 MG/DL — SIGNIFICANT CHANGE UP (ref 70–99)
GRAM STN FLD: SIGNIFICANT CHANGE UP
HCT VFR BLD CALC: 29.8 % — LOW (ref 34.5–45)
HGB BLD-MCNC: 9.9 G/DL — LOW (ref 11.5–15.5)
IMM GRANULOCYTES NFR BLD AUTO: 1 % — SIGNIFICANT CHANGE UP (ref 0–1.5)
LYMPHOCYTES # BLD AUTO: 0.5 K/UL — LOW (ref 1–3.3)
LYMPHOCYTES # BLD AUTO: 8.3 % — LOW (ref 13–44)
MCHC RBC-ENTMCNC: 28.7 PG — SIGNIFICANT CHANGE UP (ref 27–34)
MCHC RBC-ENTMCNC: 33.2 GM/DL — SIGNIFICANT CHANGE UP (ref 32–36)
MCV RBC AUTO: 86.4 FL — SIGNIFICANT CHANGE UP (ref 80–100)
MONOCYTES # BLD AUTO: 0.7 K/UL — SIGNIFICANT CHANGE UP (ref 0–0.9)
MONOCYTES NFR BLD AUTO: 11.7 % — SIGNIFICANT CHANGE UP (ref 2–14)
NEUTROPHILS # BLD AUTO: 4.63 K/UL — SIGNIFICANT CHANGE UP (ref 1.8–7.4)
NEUTROPHILS NFR BLD AUTO: 77.2 % — HIGH (ref 43–77)
PLATELET # BLD AUTO: 149 K/UL — LOW (ref 150–400)
POTASSIUM SERPL-MCNC: 3.3 MMOL/L — LOW (ref 3.5–5.3)
POTASSIUM SERPL-SCNC: 3.3 MMOL/L — LOW (ref 3.5–5.3)
PROT SERPL-MCNC: 5.9 G/DL — LOW (ref 6–8.3)
RBC # BLD: 3.45 M/UL — LOW (ref 3.8–5.2)
RBC # FLD: 13.6 % — SIGNIFICANT CHANGE UP (ref 10.3–14.5)
SODIUM SERPL-SCNC: 139 MMOL/L — SIGNIFICANT CHANGE UP (ref 135–145)
WBC # BLD: 6 K/UL — SIGNIFICANT CHANGE UP (ref 3.8–10.5)
WBC # FLD AUTO: 6 K/UL — SIGNIFICANT CHANGE UP (ref 3.8–10.5)

## 2018-12-08 RX ORDER — IBUPROFEN 200 MG
400 TABLET ORAL EVERY 6 HOURS
Qty: 0 | Refills: 0 | Status: DISCONTINUED | OUTPATIENT
Start: 2018-12-08 | End: 2018-12-09

## 2018-12-08 RX ORDER — HYDROCHLOROTHIAZIDE 25 MG
25 TABLET ORAL ONCE
Qty: 0 | Refills: 0 | Status: COMPLETED | OUTPATIENT
Start: 2018-12-08 | End: 2018-12-08

## 2018-12-08 RX ORDER — POTASSIUM CHLORIDE 20 MEQ
40 PACKET (EA) ORAL EVERY 4 HOURS
Qty: 0 | Refills: 0 | Status: COMPLETED | OUTPATIENT
Start: 2018-12-08 | End: 2018-12-08

## 2018-12-08 RX ADMIN — Medication 100 MILLIGRAM(S): at 13:48

## 2018-12-08 RX ADMIN — AMPICILLIN SODIUM AND SULBACTAM SODIUM 200 GRAM(S): 250; 125 INJECTION, POWDER, FOR SUSPENSION INTRAMUSCULAR; INTRAVENOUS at 00:22

## 2018-12-08 RX ADMIN — Medication 25 MILLIGRAM(S): at 14:10

## 2018-12-08 RX ADMIN — Medication 400 MILLIGRAM(S): at 00:22

## 2018-12-08 RX ADMIN — Medication 10 MILLIGRAM(S): at 17:44

## 2018-12-08 RX ADMIN — Medication 100 MILLIGRAM(S): at 07:07

## 2018-12-08 RX ADMIN — Medication 10 MILLIGRAM(S): at 11:51

## 2018-12-08 RX ADMIN — Medication 40 MILLIEQUIVALENT(S): at 11:56

## 2018-12-08 RX ADMIN — Medication 400 MILLIGRAM(S): at 01:45

## 2018-12-08 RX ADMIN — Medication 10 MILLIGRAM(S): at 00:23

## 2018-12-08 RX ADMIN — AMPICILLIN SODIUM AND SULBACTAM SODIUM 200 GRAM(S): 250; 125 INJECTION, POWDER, FOR SUSPENSION INTRAMUSCULAR; INTRAVENOUS at 11:55

## 2018-12-08 RX ADMIN — AMPICILLIN SODIUM AND SULBACTAM SODIUM 200 GRAM(S): 250; 125 INJECTION, POWDER, FOR SUSPENSION INTRAMUSCULAR; INTRAVENOUS at 06:25

## 2018-12-08 RX ADMIN — AMPICILLIN SODIUM AND SULBACTAM SODIUM 200 GRAM(S): 250; 125 INJECTION, POWDER, FOR SUSPENSION INTRAMUSCULAR; INTRAVENOUS at 17:47

## 2018-12-08 RX ADMIN — Medication 2.5 MILLIGRAM(S): at 07:06

## 2018-12-08 RX ADMIN — Medication 100 MILLIGRAM(S): at 02:46

## 2018-12-08 RX ADMIN — FAMOTIDINE 20 MILLIGRAM(S): 10 INJECTION INTRAVENOUS at 17:44

## 2018-12-08 RX ADMIN — Medication 40 MILLIEQUIVALENT(S): at 13:48

## 2018-12-08 RX ADMIN — Medication 2.5 MILLIGRAM(S): at 17:44

## 2018-12-08 RX ADMIN — Medication 10 MILLIGRAM(S): at 23:50

## 2018-12-08 RX ADMIN — Medication 10 MILLIGRAM(S): at 07:07

## 2018-12-08 RX ADMIN — ENOXAPARIN SODIUM 40 MILLIGRAM(S): 100 INJECTION SUBCUTANEOUS at 11:51

## 2018-12-08 RX ADMIN — FAMOTIDINE 20 MILLIGRAM(S): 10 INJECTION INTRAVENOUS at 07:07

## 2018-12-08 RX ADMIN — AMPICILLIN SODIUM AND SULBACTAM SODIUM 200 GRAM(S): 250; 125 INJECTION, POWDER, FOR SUSPENSION INTRAMUSCULAR; INTRAVENOUS at 23:49
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
## 2018-12-08 NOTE — PROGRESS NOTE ADULT - SUBJECTIVE AND OBJECTIVE BOX
[facility], Division of Infectious Diseases  FL[place]ITO Flanagan A. Lee  [phone]    Name: CECY BATISTA  Age: 58y  Gender: Female  MRN: [id number]    Interval History--  Notes reviewed. Seen earlier today.  Asked to follow up as patient elected to remain in house.  Subsequently had fever.  Patient was concerned that she wasn't getting better.  Also called by nursing that patient with +BC  Added clinda pending my re-evaluation, now stopped  D/W micro lab, I had tech review slide isolate is coryneform in appearance on gram stain- does not look like Listeria, Clostridia, etc. No growth on plates (would not expect any in <12h)  Refused lovenox dvt PPX  No new complaints.    Past Medical History--  Quadriplegic spinal paralysis  Hypotension  Autonomic dysreflexia  Muscle spasm  Spinal cord compression  Water retention  S/P cervical spinal fusion      For details regarding the patient's social history, family history, and other miscellaneous elements, please refer the initial infectious diseases consultation and/or the admitting history and physical examination for this admission.    Allergies    No Known Allergies    Intolerances    latex (Unknown)      Medications--  Antibiotics:  ampicillin/sulbactam  IVPB 3 Gram(s) IV Intermittent every 6 hours  ampicillin/sulbactam  IVPB      nitrofurantoin macrocrystals (MACRODANTIN) 50 milliGRAM(s) Oral daily    Immunologic:  influenza   Vaccine 0.5 milliLiter(s) IntraMuscular once    Other:  acetaminophen  Suppository .. PRN  baclofen  bisacodyl Suppository PRN  docusate sodium  enoxaparin Injectable  famotidine    Tablet  oxybutynin  senna      Review of Systems--  A 10-point review of systems was obtained.   Review of systems otherwise negative except as previously noted.    Physical Examination--  Vital Signs: T(F): 97.7 (12-08-18 @ 04:35), Max: 101.3 (12-07-18 @ 21:45)  HR: 69 (12-08-18 @ 04:35)  BP: 96/69 (12-08-18 @ 04:35)  RR: 18 (12-08-18 @ 04:35)  SpO2: 93% (12-08-18 @ 04:35)  Wt(kg): --  General: Nontoxic appearing,  less anxious woman in no acute distress   Ext: Involved extremity: Right Lower--   Proximal lymphadenopathy: absent.   Lymphangitis: absent.   Tenderness: insensate  Calor: mild  Edema:3+  Erythema: present , diminished       Intensity: mild       Extent: patchy above ankle to below knee       Blanching: yes  Crepitus: absent.   Fluctuance: absent.   Devitalized tissue: absent.   Anesthesia: unable, insensate  Bullae: absent.   Ulceration: absent.   Drainage: absent.   Odor: absent.   Pulses: not palpable in foot due to edema, but perfusion to foot appears preserved.  Otherwise No cyanosis, clubbing or edema.        Laboratory Studies--  CBC                        9.9    6.00  )-----------( 149      ( 08 Dec 2018 07:21 )             29.8       Chemistries  12-08    139  |  105  |  6<L>  ----------------------------<  78  3.3<L>   |  25  |  0.22<L>    Ca    7.7<L>      08 Dec 2018 07:21    TPro  5.9<L>  /  Alb  2.5<L>  /  TBili  1.1  /  DBili  x   /  AST  21  /  ALT  37  /  AlkPhos  111  12-08      Culture Data    Culture - Urine (collected 06 Dec 2018 00:25)  Source: .Urine Catheterized  Final Report (06 Dec 2018 22:51):    No growth    Culture - Blood (collected 05 Dec 2018 21:48)  Source: .Blood Blood  Preliminary Report (06 Dec 2018 22:01):    No growth to date.    Culture - Blood (collected 05 Dec 2018 21:48)  Source: .Blood Blood  Gram Stain (08 Dec 2018 01:08):    Growth in aerobic bottle: Gram Positive Rods  Preliminary Report (08 Dec 2018 01:08):    Growth in aerobic bottle: Gram Positive Rods

## 2018-12-08 NOTE — PROGRESS NOTE ADULT - PROBLEM SELECTOR PLAN 10
Lovenox for DVT PPX  Famotidine for dyspepsia  bedbound, needs frequent repositioning  Social work consult, patient's sister, who helps provide care, moving to [place]    IMPROVE VTE Individual Risk Assessment          RISK                                                          Points  [  ] Previous VTE                                                3  [  ] Thrombophilia                                             2  [  ] Lower limb paralysis                                   2        (unable to hold up >15 seconds)    [  ] Current Cancer                                             2         (within 6 months)  [  ] Immobilization > 24 hrs                              1  [  ] ICU/CCU stay > 24 hours                             1  [  ] Age > 60                                                         1    IMPROVE VTE Score: 3

## 2018-12-08 NOTE — PROGRESS NOTE ADULT - PROBLEM SELECTOR PLAN 1
Continue Unasyn  No additional vancomycin  Serial exams  F/U Cx data, suspect contaminant here  Monitor temperature curve  Again would like to see patient afebrile ~48h before discharge

## 2018-12-08 NOTE — PROGRESS NOTE ADULT - ASSESSMENT
58f with quadriplegia, htn  admitted fever, leukocytosis secondary to rle cellulitis --nonpurulent  CTA no PE  Breathing better with lasix.  + blood culture, contaminant  Still febrile  Exam to my eye if anything better than yesterday  Concern for deep process at this time remains low  Explained to patient in layman's terms rationale for lovenox DVT PPx. Patient voiced understanding (though not sure she will allow).

## 2018-12-08 NOTE — PROGRESS NOTE ADULT - ASSESSMENT
59 yo F PMH spinal cord injury, functional quadriplegic, muscle spasms, overactive bladder, LE edema, autonomic dysreflexia being admitted for RLE cellulitis. Patient complains of SOB and difficulty breathing this morning with 2+ pitting edema in b/l lower extremity R>L. Patient requesting to leave AMA. Patient feels that she does not have adequate supplies and support in hospital. States she has a special chair she sits in which cannot be brought here.

## 2018-12-08 NOTE — PROGRESS NOTE ADULT - PROBLEM SELECTOR PLAN 1
- right LE cellulitis, Fever 101.3 F rectal last night  - WBC Normal   - continue Tylenol suppositories PRN for fever  -- S/P dose of clindamycin as per ID, on  Unasyn IVPB 3g Q6H  - ID  Dr. Swanson  D/W at detail, Pt on Iv Abx   - D/W pt again- Not stable for d/c home with Fever  - follow up blood c/s x 2: NEG so far  - urine culture: No growth  -chest xray negative  - US Duplex Venous LE Bilateral negative for DVT  -[place] cath d/[place]. PRN Motrin for HA & Body pain as per Pt

## 2018-12-08 NOTE — PROGRESS NOTE ADULT - PROBLEM SELECTOR PLAN 2
- patient had a rapid response on 12/6 likely 2/2 to SOB /anxiety.   - patient reports repeated episodes of hypertension and bradycardia, with associated headache when she is not able to self cath her bladder as quickly as possible  - patient's sister and aides are trained to perform cath and patient has brought own supplies.   - patient given call button designed for people with limited dexterity so she can alert nursing staff when she needs to be helped with anything  - straight cath 5x/day & As needed D/W Nursing staff at detail

## 2018-12-08 NOTE — PROGRESS NOTE ADULT - PROBLEM SELECTOR PLAN 6
patient takes 5omg HCTZ for her edema  will give 25mg HCTZ when needed for extremity edema and SOB after getting vitals signs and evaluating to see if warranted.  - continue to monitor for increased LE edema, electrolyte abnormalities

## 2018-12-09 VITALS — SYSTOLIC BLOOD PRESSURE: 104 MMHG | DIASTOLIC BLOOD PRESSURE: 72 MMHG | HEART RATE: 83 BPM

## 2018-12-09 LAB
ALBUMIN SERPL ELPH-MCNC: 2.7 G/DL — LOW (ref 3.3–5)
ALP SERPL-CCNC: 125 U/L — HIGH (ref 40–120)
ALT FLD-CCNC: 34 U/L — SIGNIFICANT CHANGE UP (ref 12–78)
ANION GAP SERPL CALC-SCNC: 10 MMOL/L — SIGNIFICANT CHANGE UP (ref 5–17)
AST SERPL-CCNC: 17 U/L — SIGNIFICANT CHANGE UP (ref 15–37)
BILIRUB SERPL-MCNC: 0.8 MG/DL — SIGNIFICANT CHANGE UP (ref 0.2–1.2)
BUN SERPL-MCNC: 5 MG/DL — LOW (ref 7–23)
CALCIUM SERPL-MCNC: 8.4 MG/DL — LOW (ref 8.5–10.1)
CHLORIDE SERPL-SCNC: 105 MMOL/L — SIGNIFICANT CHANGE UP (ref 96–108)
CO2 SERPL-SCNC: 25 MMOL/L — SIGNIFICANT CHANGE UP (ref 22–31)
CREAT SERPL-MCNC: 0.23 MG/DL — LOW (ref 0.5–1.3)
CULTURE RESULTS: SIGNIFICANT CHANGE UP
GLUCOSE SERPL-MCNC: 60 MG/DL — LOW (ref 70–99)
HCT VFR BLD CALC: 35.5 % — SIGNIFICANT CHANGE UP (ref 34.5–45)
HGB BLD-MCNC: 11.8 G/DL — SIGNIFICANT CHANGE UP (ref 11.5–15.5)
MCHC RBC-ENTMCNC: 28.7 PG — SIGNIFICANT CHANGE UP (ref 27–34)
MCHC RBC-ENTMCNC: 33.2 GM/DL — SIGNIFICANT CHANGE UP (ref 32–36)
MCV RBC AUTO: 86.4 FL — SIGNIFICANT CHANGE UP (ref 80–100)
NRBC # BLD: 0 /100 WBCS — SIGNIFICANT CHANGE UP (ref 0–0)
PLATELET # BLD AUTO: 202 K/UL — SIGNIFICANT CHANGE UP (ref 150–400)
POTASSIUM SERPL-MCNC: 4.2 MMOL/L — SIGNIFICANT CHANGE UP (ref 3.5–5.3)
POTASSIUM SERPL-SCNC: 4.2 MMOL/L — SIGNIFICANT CHANGE UP (ref 3.5–5.3)
PROT SERPL-MCNC: 6.6 G/DL — SIGNIFICANT CHANGE UP (ref 6–8.3)
RBC # BLD: 4.11 M/UL — SIGNIFICANT CHANGE UP (ref 3.8–5.2)
RBC # FLD: 13.8 % — SIGNIFICANT CHANGE UP (ref 10.3–14.5)
SODIUM SERPL-SCNC: 140 MMOL/L — SIGNIFICANT CHANGE UP (ref 135–145)
SPECIMEN SOURCE: SIGNIFICANT CHANGE UP
WBC # BLD: 5.01 K/UL — SIGNIFICANT CHANGE UP (ref 3.8–10.5)
WBC # FLD AUTO: 5.01 K/UL — SIGNIFICANT CHANGE UP (ref 3.8–10.5)

## 2018-12-09 PROCEDURE — 71045 X-RAY EXAM CHEST 1 VIEW: CPT

## 2018-12-09 PROCEDURE — 87086 URINE CULTURE/COLONY COUNT: CPT

## 2018-12-09 PROCEDURE — 36415 COLL VENOUS BLD VENIPUNCTURE: CPT

## 2018-12-09 PROCEDURE — 96365 THER/PROPH/DIAG IV INF INIT: CPT

## 2018-12-09 PROCEDURE — 99285 EMERGENCY DEPT VISIT HI MDM: CPT | Mod: 25

## 2018-12-09 PROCEDURE — 82962 GLUCOSE BLOOD TEST: CPT

## 2018-12-09 PROCEDURE — 81001 URINALYSIS AUTO W/SCOPE: CPT

## 2018-12-09 PROCEDURE — 83036 HEMOGLOBIN GLYCOSYLATED A1C: CPT

## 2018-12-09 PROCEDURE — 80053 COMPREHEN METABOLIC PANEL: CPT

## 2018-12-09 PROCEDURE — 93970 EXTREMITY STUDY: CPT

## 2018-12-09 PROCEDURE — 87040 BLOOD CULTURE FOR BACTERIA: CPT

## 2018-12-09 PROCEDURE — 83605 ASSAY OF LACTIC ACID: CPT

## 2018-12-09 PROCEDURE — 36600 WITHDRAWAL OF ARTERIAL BLOOD: CPT

## 2018-12-09 PROCEDURE — 93005 ELECTROCARDIOGRAM TRACING: CPT

## 2018-12-09 PROCEDURE — 82803 BLOOD GASES ANY COMBINATION: CPT

## 2018-12-09 PROCEDURE — 85027 COMPLETE CBC AUTOMATED: CPT

## 2018-12-09 PROCEDURE — 71275 CT ANGIOGRAPHY CHEST: CPT

## 2018-12-09 RX ORDER — IBUPROFEN 200 MG
1 TABLET ORAL
Qty: 0 | Refills: 0 | COMMUNITY
Start: 2018-12-09

## 2018-12-09 RX ORDER — HYDROCHLOROTHIAZIDE 25 MG
50 TABLET ORAL ONCE
Qty: 0 | Refills: 0 | Status: COMPLETED | OUTPATIENT
Start: 2018-12-09 | End: 2018-12-09

## 2018-12-09 RX ADMIN — Medication 2.5 MILLIGRAM(S): at 17:46

## 2018-12-09 RX ADMIN — AMPICILLIN SODIUM AND SULBACTAM SODIUM 200 GRAM(S): 250; 125 INJECTION, POWDER, FOR SUSPENSION INTRAMUSCULAR; INTRAVENOUS at 17:49

## 2018-12-09 RX ADMIN — AMPICILLIN SODIUM AND SULBACTAM SODIUM 200 GRAM(S): 250; 125 INJECTION, POWDER, FOR SUSPENSION INTRAMUSCULAR; INTRAVENOUS at 11:59

## 2018-12-09 RX ADMIN — Medication 10 MILLIGRAM(S): at 17:47

## 2018-12-09 RX ADMIN — Medication 50 MILLIGRAM(S): at 13:19

## 2018-12-09 RX ADMIN — Medication 10 MILLIGRAM(S): at 07:08

## 2018-12-09 RX ADMIN — Medication 2.5 MILLIGRAM(S): at 07:08

## 2018-12-09 RX ADMIN — Medication 10 MILLIGRAM(S): at 11:56

## 2018-12-09 RX ADMIN — FAMOTIDINE 20 MILLIGRAM(S): 10 INJECTION INTRAVENOUS at 17:47

## 2018-12-09 RX ADMIN — AMPICILLIN SODIUM AND SULBACTAM SODIUM 200 GRAM(S): 250; 125 INJECTION, POWDER, FOR SUSPENSION INTRAMUSCULAR; INTRAVENOUS at 06:27

## 2018-12-09 RX ADMIN — FAMOTIDINE 20 MILLIGRAM(S): 10 INJECTION INTRAVENOUS at 07:08

## 2018-12-09 RX ADMIN — ENOXAPARIN SODIUM 40 MILLIGRAM(S): 100 INJECTION SUBCUTANEOUS at 11:55

## 2018-12-09 NOTE — PROGRESS NOTE ADULT - PROBLEM SELECTOR PLAN 2
- patient had a rapid response on 12/6 likely 2/2 to SOB /anxiety  - patient reports repeated episodes of hypertension and bradycardia, with associated headache when she is not able to self cath her bladder as quickly as possible  - patient's sister and aides are trained to perform cath and patient has brought own supplies.   - patient given call button designed for people with limited dexterity so she can alert nursing staff when she needs to be helped with anything  - straight cath 5x/day & As needed D/W Nursing staff at detail

## 2018-12-09 NOTE — PROGRESS NOTE ADULT - ASSESSMENT
58f with quadriplegia, htn  admitted fever, leukocytosis secondary to rle cellulitis --nonpurulent  + blood culture, contaminant  Still febrile  Exam continues to improve  Ideally would like to see patient afebrile for 48H but patient anxious to leave and appears to be making good progress  Patient's blood cx remains without full ID but most consistent with contamination

## 2018-12-09 NOTE — PROGRESS NOTE ADULT - SUBJECTIVE AND OBJECTIVE BOX
[facility], Division of Infectious Diseases  FLAKITO Flanagan A. Lee  [phone]    Name: CECY BATISTA  Age: 58y  Gender: Female  MRN: [id number]    Interval History--  Notes reviewed. Seen earlier today. Feeling better. No fevers, chills, or rigors. No new issues.     Past Medical History--  Quadriplegic spinal paralysis  Hypotension  Autonomic dysreflexia  Muscle spasm  Spinal cord compression  Water retention  S/P cervical spinal fusion      For details regarding the patient's social history, family history, and other miscellaneous elements, please refer the initial infectious diseases consultation and/or the admitting history and physical examination for this admission.    Allergies    No Known Allergies    Intolerances    latex (Unknown)      Medications--  Antibiotics:  ampicillin/sulbactam  IVPB 3 Gram(s) IV Intermittent every 6 hours  ampicillin/sulbactam  IVPB      nitrofurantoin macrocrystals (MACRODANTIN) 50 milliGRAM(s) Oral daily    Immunologic:  influenza   Vaccine 0.5 milliLiter(s) IntraMuscular once    Other:  acetaminophen  Suppository .. PRN  baclofen  bisacodyl Suppository PRN  docusate sodium  enoxaparin Injectable  famotidine    Tablet  hydrochlorothiazide  ibuprofen  Tablet. PRN  oxybutynin  senna      Review of Systems--  A 10-point review of systems was obtained.   Review of systems otherwise unchanged compared to prior visit except as previously noted.    Physical Examination--  Vital Signs: T(F): 98.1 (12-09-18 @ 05:31), Max: 98.1 (12-09-18 @ 05:31)  HR: 89 (12-09-18 @ 05:31)  BP: 120/81 (12-09-18 @ 05:31)  RR: 18 (12-09-18 @ 05:31)  SpO2: 97% (12-09-18 @ 05:31)  Wt(kg): --  General: Nontoxic appearing,  less anxious woman in no acute distress   Ext: Involved extremity: Right Lower--   Proximal lymphadenopathy: absent.   Lymphangitis: absent.   Tenderness: insensate  Calor: mild  Edema: 2+  Erythema: present , diminished       Intensity: mild       Extent: patchy above ankle to below knee       Blanching: yes  Crepitus: absent.   Fluctuance: absent.   Devitalized tissue: absent.   Anesthesia: unable, insensate  Bullae: absent.   Ulceration: absent.   Drainage: absent.   Odor: absent.   Pulses: not palpable in foot due to edema, but perfusion to foot appears preserved.  Otherwise No cyanosis, clubbing or edema.      Laboratory Studies--  CBC                        11.8   5.01  )-----------( 202      ( 09 Dec 2018 08:56 )             35.5       Chemistries  12-09    140  |  105  |  5<L>  ----------------------------<  60<L>  4.2   |  25  |  0.23<L>    Ca    8.4<L>      09 Dec 2018 08:56    TPro  6.6  /  Alb  2.7<L>  /  TBili  0.8  /  DBili  x   /  AST  17  /  ALT  34  /  AlkPhos  125<H>  12-09      Culture Data    Culture - Urine (collected 06 Dec 2018 00:25)  Source: .Urine Catheterized  Final Report (06 Dec 2018 22:51):    No growth    Culture - Blood (collected 05 Dec 2018 21:48)  Source: .Blood Blood  Preliminary Report (06 Dec 2018 22:01):    No growth to date.    Culture - Blood (collected 05 Dec 2018 21:48)  Source: .Blood Blood  Gram Stain (08 Dec 2018 01:08):    Growth in aerobic bottle: Gram Positive Rods  Preliminary Report (08 Dec 2018 01:08):    Growth in aerobic bottle: Gram Positive Rods

## 2018-12-09 NOTE — PROGRESS NOTE ADULT - PROBLEM SELECTOR PLAN 1
No objection to DC on Augmentin  10 days total antibiotics, including what she's received in the hospital.

## 2018-12-09 NOTE — PROGRESS NOTE ADULT - ATTENDING COMMENTS
D/W Dr. BONNIE Swanson MD  [phone]
Thank you for the courtesy of this referral.    D/W Dr. BONNIE Pryor    I'll sign off at this time.     Trever Swanson MD  [phone]
Thank you for the courtesy of this referral.  I'll sign off at this time.   Recall if the patient changes her plans.    Trever Swanson MD  [phone]
Pt seen, Examined, case & care plan d/w pt, residents, DR Swanson, at detail. Pt's HHA at bed side, Family at bed side  AM labs  D/W RN
Pt seen, Examined, case & care plan d/w pt,  DR Swanson, at detail. Pt's HHA at bed side, Pt DOES NOT want to stay tonight , wants to go home today after 6 PM dose of Abx, as per Dr Swanson he would want pt to stay 1 more night today, Pt states she does NOT have Money to pay Her HHA to stay in Hospital. D/W ID , OK for D/C on PO Abx  D/C Home Total D/C care time is 45 minutes.   D/W RN
Pt seen, Examined, case & care plan d/w pt, residents, DR Swanson, Brother at detail.  D/W  at detail.  Pt to follow with PMD for labs, for Multinodular Goiter/ CBC,CMP & TFT's as out pt   pt advised at detail NOT to leave AMA, pt is refusing to stay in Hospital ,  Pt to leave AMA.
Pt seen, Examined, Case & care plan d/w pt, residents at Detail.  D/W ID Dr traylor at detail.  D/W sister at bed side,  D/W RN & 1 east RN supervisor at detail.  AM labs  Pt agrees to take PO K Dur, PO Motrin, Dulcolax suppository,
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
## 2018-12-09 NOTE — PROGRESS NOTE ADULT - PROBLEM SELECTOR PLAN 3
patient takes 5omg HCTZ for her edema  will give 50 mg HCTZ today &  when needed for extremity edema and SOB after getting vitals signs and evaluating to see if warranted.  - continue to monitor for increased LE edema, electrolyte abnormalities

## 2018-12-09 NOTE — PROGRESS NOTE ADULT - PROBLEM SELECTOR PROBLEM 1
Cellulitis of lower extremity, unspecified laterality

## 2018-12-09 NOTE — PROGRESS NOTE ADULT - PROBLEM SELECTOR PLAN 1
- right LE cellulitis Improving , Fever resolved, Nl WBC  - WBC Normal   - continue Tylenol suppositories PRN for fever  -- S/P dose of clindamycin as per ID, on  Unasyn IVPB 3g Q6H  - ID  Dr. Swanson  D/W at detail, Pt on Iv Abx ,change to PO Augmentin 2x day x 6 more days, Total 10 days Abx as per ID  - D/W pt again- Not stable for d/c home with Fever  - follow up blood c/s x 2: NEG so far  - urine culture: No growth  -chest xray negative  - US Duplex Venous LE Bilateral negative for DVT  -[place] cath  to be d/[place] on D/C PRN Motrin for HA & Body pain as per Pt

## 2018-12-10 LAB
CULTURE RESULTS: SIGNIFICANT CHANGE UP
SPECIMEN SOURCE: SIGNIFICANT CHANGE UP

## 2019-06-06 NOTE — ED PROVIDER NOTE - CARDIAC, MLM
Normal rate, regular rhythm.  Heart sounds S1, S2.  No murmurs, rubs or gallops. Spine appears normal, range of motion is not limited, no muscle or joint tenderness

## 2020-06-12 PROBLEM — R60.9 EDEMA, UNSPECIFIED: Chronic | Status: ACTIVE | Noted: 2018-12-05

## 2020-06-12 PROBLEM — M62.838 OTHER MUSCLE SPASM: Chronic | Status: ACTIVE | Noted: 2018-12-05

## 2020-06-12 PROBLEM — G90.4 AUTONOMIC DYSREFLEXIA: Chronic | Status: ACTIVE | Noted: 2018-12-05

## 2020-06-12 PROBLEM — G82.50 QUADRIPLEGIA, UNSPECIFIED: Chronic | Status: ACTIVE | Noted: 2018-12-05

## 2020-06-12 PROBLEM — I95.9 HYPOTENSION, UNSPECIFIED: Chronic | Status: ACTIVE | Noted: 2018-12-05

## 2020-06-12 PROBLEM — G95.20 UNSPECIFIED CORD COMPRESSION: Chronic | Status: ACTIVE | Noted: 2018-12-05

## 2020-07-27 ENCOUNTER — APPOINTMENT (OUTPATIENT)
Dept: PHYSICAL MEDICINE AND REHAB | Facility: CLINIC | Age: 60
End: 2020-07-27

## 2021-05-21 NOTE — ED ADULT TRIAGE NOTE - ACCOMPANIED BY
Needs letter stating she should not have Ultatherapy for her neck due to heart disease and gerd.  Would like letter with todays date.       EMT/paramedic

## 2022-07-26 NOTE — PROGRESS NOTE ADULT - PROBLEM SELECTOR PROBLEM 2
Autonomic dysreflexia Isotretinoin Counseling: Patient should get monthly blood tests, not donate blood, not drive at night if vision affected, not share medication, and not undergo elective surgery for 6 months after tx completed. Side effects reviewed, pt to contact office should one occur.

## 2023-03-06 ENCOUNTER — APPOINTMENT (OUTPATIENT)
Dept: PHYSICAL MEDICINE AND REHAB | Facility: CLINIC | Age: 63
End: 2023-03-06
Payer: MEDICARE

## 2023-03-06 ENCOUNTER — NON-APPOINTMENT (OUTPATIENT)
Age: 63
End: 2023-03-06

## 2023-03-06 VITALS
SYSTOLIC BLOOD PRESSURE: 95 MMHG | OXYGEN SATURATION: 96 % | HEART RATE: 58 BPM | DIASTOLIC BLOOD PRESSURE: 69 MMHG | TEMPERATURE: 96.6 F

## 2023-03-06 DIAGNOSIS — Z86.79 PERSONAL HISTORY OF OTHER DISEASES OF THE CIRCULATORY SYSTEM: ICD-10-CM

## 2023-03-06 DIAGNOSIS — N31.9 NEUROMUSCULAR DYSFUNCTION OF BLADDER, UNSPECIFIED: ICD-10-CM

## 2023-03-06 DIAGNOSIS — M41.40 NEUROMUSCULAR SCOLIOSIS, SITE UNSPECIFIED: ICD-10-CM

## 2023-03-06 DIAGNOSIS — R60.9 EDEMA, UNSPECIFIED: ICD-10-CM

## 2023-03-06 PROCEDURE — G0372 MD SERVICE REQUIRED FOR PMD: CPT

## 2023-03-06 PROCEDURE — 99203 OFFICE O/P NEW LOW 30 MIN: CPT

## 2023-03-06 NOTE — HISTORY OF PRESENT ILLNESS
[FreeTextEntry1] : 62 woman with history of C6 complete tetraplegia since 1976. She has been a full time power wheelchair use for many years and is in need of a new power wc as her current chair is aged, required frequent repairs and no longer fits her well. She denies changes in neurologic status and has not lost any movement or sensation that she had post injury.

## 2023-03-06 NOTE — REASON FOR VISIT
[Initial Evaluation] : an initial evaluation [Formal Caregiver] : formal caregiver [FreeTextEntry1] : face to face evaluation for new power wheelchair.

## 2023-03-06 NOTE — PHYSICAL EXAM
[Normal] : Oriented to person, place, and time, insight and judgement were intact and the affect was normal [5] : C5 elbow flexion 5/5 [3] : C6 wrist extensors 3/5 [0] : S1 ankle flexors 0/5 [Right: _____] : Right: [unfilled] [Left: _____] : Left: [unfilled] [de-identified] : There is 2+ edema of both legs in spite of use of compression stockings.  [de-identified] : Limited PROM right shoulder to about 110 degrees abduction. Wears wrist hand orthosis right.  [de-identified] : No sig spasticity in legs. See below. [TWNoteComboBox1] : A

## 2023-03-06 NOTE — ASSESSMENT
[FreeTextEntry1] : 62 woman with long history of C5 (sensory), C6 (motor) AIS A tetrapelgia.\par She requires a new power wheelchair with power tilt/power seating to allow for independent mobility in her residence. Power seating allows for pressure ulcer prevention, relief of edema in legs, relief of pain in neck. She cannot propel a manual wheelchair of any kind due to weakness of multiple muscles of upper limbs, total paralysis of lower limbs and absent trunk control. She cannot stand or walk.  Further, her history of congestive heart failure further supports use of a power wheelchair as a medical necessity.
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
## 2023-03-06 NOTE — SOCIAL HISTORY
[Private Home] : in a private home [Lives Alone] : Only the patient, ~he/she~ lives alone [] : Patient has a home health aide [Power Wheelchair] : power wheelchair [FreeTextEntry6] : Works from home.

## 2023-03-13 ENCOUNTER — APPOINTMENT (OUTPATIENT)
Dept: PHYSICAL MEDICINE AND REHAB | Facility: CLINIC | Age: 63
End: 2023-03-13

## 2023-11-16 NOTE — PATIENT PROFILE ADULT - NSPROMUTINFOINDIVIDFT_GEN_A_NUR
Name: Calra Reed      Relationship: Self      Best Callback Number: [phone]      HUB PROVIDED THE RELAY MESSAGE FROM THE OFFICE      PATIENT: SCHEDULED PER NOTE    ADDITIONAL INFORMATION: PATIENT EXPRESSED UNDERSTANDING AND WE SCHEDULED AN APPOINTMENT   none

## 2023-11-29 NOTE — PROGRESS NOTE ADULT - PROBLEM/PLAN-3
See x-ray result note
DISPLAY PLAN FREE TEXT
